# Patient Record
Sex: FEMALE | Race: WHITE | Employment: UNEMPLOYED | URBAN - METROPOLITAN AREA
[De-identification: names, ages, dates, MRNs, and addresses within clinical notes are randomized per-mention and may not be internally consistent; named-entity substitution may affect disease eponyms.]

---

## 2023-08-20 ENCOUNTER — HOSPITAL ENCOUNTER (EMERGENCY)
Age: 28
Discharge: HOME | End: 2023-08-20
Attending: EMERGENCY MEDICINE

## 2023-08-20 ENCOUNTER — APPOINTMENT (OUTPATIENT)
Dept: CT IMAGING | Age: 28
End: 2023-08-20

## 2023-08-20 VITALS
BODY MASS INDEX: 34.15 KG/M2 | RESPIRATION RATE: 18 BRPM | DIASTOLIC BLOOD PRESSURE: 99 MMHG | TEMPERATURE: 97.9 F | SYSTOLIC BLOOD PRESSURE: 142 MMHG | HEART RATE: 90 BPM | OXYGEN SATURATION: 99 % | WEIGHT: 200 LBS | HEIGHT: 64 IN

## 2023-08-20 DIAGNOSIS — H57.9 BILATERAL EYE COMPLAINT: Primary | ICD-10-CM

## 2023-08-20 PROCEDURE — 70450 CT HEAD/BRAIN W/O DYE: CPT

## 2023-08-20 PROCEDURE — 99284 EMERGENCY DEPT VISIT MOD MDM: CPT

## 2023-08-20 RX ORDER — ACETAZOLAMIDE 250 MG/1
250 TABLET ORAL 2 TIMES DAILY
Qty: 60 TABLET | Refills: 0 | Status: SHIPPED | OUTPATIENT
Start: 2023-08-20 | End: 2023-09-19

## 2023-08-20 RX ORDER — TOPIRAMATE 50 MG/1
50 TABLET, FILM COATED ORAL SEE ADMIN INSTRUCTIONS
Qty: 45 TABLET | Refills: 0 | Status: SHIPPED | OUTPATIENT
Start: 2023-08-20 | End: 2023-09-19

## 2023-08-20 RX ORDER — POTASSIUM CHLORIDE 750 MG/1
10 TABLET, EXTENDED RELEASE ORAL EVERY OTHER DAY
Qty: 15 TABLET | Refills: 0 | Status: SHIPPED | OUTPATIENT
Start: 2023-08-20 | End: 2023-09-19

## 2023-08-20 RX ORDER — FUROSEMIDE 20 MG/1
20 TABLET ORAL EVERY OTHER DAY
Qty: 15 TABLET | Refills: 0 | Status: SHIPPED | OUTPATIENT
Start: 2023-08-20 | End: 2023-09-19

## 2023-08-20 ASSESSMENT — LIFESTYLE VARIABLES
HOW OFTEN DO YOU HAVE A DRINK CONTAINING ALCOHOL: NEVER
HOW MANY STANDARD DRINKS CONTAINING ALCOHOL DO YOU HAVE ON A TYPICAL DAY: PATIENT DOES NOT DRINK

## 2023-08-20 ASSESSMENT — VISUAL ACUITY
OS: 20/20
OD: 20/20
OU: 20/20

## 2023-08-20 ASSESSMENT — ENCOUNTER SYMPTOMS
SHORTNESS OF BREATH: 0
EYE PAIN: 0
ABDOMINAL PAIN: 0
NAUSEA: 0
PHOTOPHOBIA: 0
VOMITING: 0

## 2023-08-20 ASSESSMENT — PAIN - FUNCTIONAL ASSESSMENT: PAIN_FUNCTIONAL_ASSESSMENT: NONE - DENIES PAIN

## 2023-08-21 NOTE — ED PROVIDER NOTES
[address] [facility]  Emergency Department    DISPOSITION Decision To Discharge 08/20/2023 11:17:07 PM       ICD-10-CM    1. Bilateral eye complaint  H57.9         ED Course     ED Course as of 08/20/23 2324   Sun Aug 20, 2023   2221 Patient is a 60-year-old female with history of intracranial hypertension that has been off medications for almost a year now and has not seen her doctor who presents with concerns of \"lazy eye\" and \"losing her vision\" and feeling \"spacey in the head\". She states she is unsure if she was having a stroke or having some complication or what was going on because it did happen the past 3 days 1, be seen. Physical exam here today looks okay including equal round and reactive pupils bilaterally, normal visual acuity, no evidence of papilledema on funduscopic. Will obtain CT scan of the head at this time. [TT]   2245 CT HEAD WO CONTRAST  IMPRESSION:  1. No acute intracranial process. MRI is more sensitive for the detection of   acute nonhemorrhagic infarct. Sanjay Luna M.D.   8/20/2023 10:42:00 PM [TT]   2316 CT unremarkable here today. Patient is not experiencing significant hypertension, severe headache, with no evidence of papilledema present that would suggest need for any sort of intervention here along with an unremarkable physical exam.  We will restart the patient on her Diamox, Topamax, another medication she used to take and encouraged her to follow-up with her specialist.  This plan was discussed with the patient who reports understanding and agreement. [TT]      ED Course User Index  [TT] Krystyna Hernandez PA-C     Complexity of Problems Addressed:  1 acute, stable illness    Data Reviewed and Analyzed:  Category 1:   I ordered each unique test.  I interpreted the results of each unique test.      Category 2:   I interpreted the CT Scan no evidence of mass or acute hemorrhage. Category 3: Discussion of management or test interpretation.   See ED Course

## 2023-08-21 NOTE — DISCHARGE INSTRUCTIONS
Physical exam here today is reassuring. Head CT is negative for any acute abnormality. We have restarted your medications used to previously take. Encourage you to schedule follow-up with your specialist.  Return to the ED as needed for new or worsening symptoms.

## 2023-08-28 ENCOUNTER — CLINICAL DOCUMENTATION (OUTPATIENT)
Dept: NEUROLOGY | Age: 28
End: 2023-08-28

## 2023-08-28 DIAGNOSIS — G93.2 PSEUDOTUMOR CEREBRI: Primary | ICD-10-CM

## 2023-09-01 ENCOUNTER — OFFICE VISIT (OUTPATIENT)
Dept: NEUROLOGY | Age: 28
End: 2023-09-01

## 2023-09-01 ENCOUNTER — HOSPITAL ENCOUNTER (OUTPATIENT)
Dept: INTERVENTIONAL RADIOLOGY/VASCULAR | Age: 28
End: 2023-09-01
Attending: PSYCHIATRY & NEUROLOGY

## 2023-09-01 VITALS — DIASTOLIC BLOOD PRESSURE: 72 MMHG | SYSTOLIC BLOOD PRESSURE: 111 MMHG | HEART RATE: 77 BPM | OXYGEN SATURATION: 97 %

## 2023-09-01 DIAGNOSIS — G93.2 PSEUDOTUMOR CEREBRI: Primary | ICD-10-CM

## 2023-09-01 DIAGNOSIS — M54.50 ACUTE BILATERAL LOW BACK PAIN WITHOUT SCIATICA: Primary | ICD-10-CM

## 2023-09-01 DIAGNOSIS — G93.2 PSEUDOTUMOR CEREBRI: ICD-10-CM

## 2023-09-01 LAB
APPEARANCE FLD: CLEAR
COLOR FLD: COLORLESS
NUC CELL # FLD: 4 /CU MM
PROT CSF-MCNC: 25 MG/DL (ref 15–45)
RBC # FLD: 107 /CU MM
SPECIMEN SOURCE FLD: NORMAL
TUBE # CSF: 1

## 2023-09-01 PROCEDURE — 6370000000 HC RX 637 (ALT 250 FOR IP): Performed by: PHYSICIAN ASSISTANT

## 2023-09-01 PROCEDURE — 2500000003 HC RX 250 WO HCPCS: Performed by: PHYSICIAN ASSISTANT

## 2023-09-01 PROCEDURE — 89050 BODY FLUID CELL COUNT: CPT

## 2023-09-01 PROCEDURE — 82040 ASSAY OF SERUM ALBUMIN: CPT

## 2023-09-01 PROCEDURE — 82042 OTHER SOURCE ALBUMIN QUAN EA: CPT

## 2023-09-01 PROCEDURE — 62328 DX LMBR SPI PNXR W/FLUOR/CT: CPT

## 2023-09-01 PROCEDURE — 82784 ASSAY IGA/IGD/IGG/IGM EACH: CPT

## 2023-09-01 PROCEDURE — 84157 ASSAY OF PROTEIN OTHER: CPT

## 2023-09-01 PROCEDURE — 83916 OLIGOCLONAL BANDS: CPT

## 2023-09-01 RX ORDER — HYDROCODONE BITARTRATE AND ACETAMINOPHEN 5; 325 MG/1; MG/1
1 TABLET ORAL EVERY 6 HOURS PRN
Qty: 3 TABLET | Refills: 0 | Status: SHIPPED | OUTPATIENT
Start: 2023-09-01 | End: 2023-09-02

## 2023-09-01 RX ORDER — HYDROCODONE BITARTRATE AND ACETAMINOPHEN 5; 325 MG/1; MG/1
TABLET ORAL PRN
Status: COMPLETED | OUTPATIENT
Start: 2023-09-01 | End: 2023-09-01

## 2023-09-01 RX ORDER — LIDOCAINE HYDROCHLORIDE 20 MG/ML
INJECTION, SOLUTION INFILTRATION; PERINEURAL PRN
Status: COMPLETED | OUTPATIENT
Start: 2023-09-01 | End: 2023-09-01

## 2023-09-01 RX ORDER — METHAZOLAMIDE 25 MG/1
25 TABLET ORAL 2 TIMES DAILY
Qty: 60 TABLET | Refills: 5 | Status: SHIPPED | OUTPATIENT
Start: 2023-09-01

## 2023-09-01 RX ADMIN — LIDOCAINE HYDROCHLORIDE 8 ML: 20 INJECTION, SOLUTION INFILTRATION; PERINEURAL at 12:21

## 2023-09-01 RX ADMIN — HYDROCODONE BITARTRATE AND ACETAMINOPHEN 1 TABLET: 5; 325 TABLET ORAL at 13:19

## 2023-09-01 NOTE — PROGRESS NOTES
[facility], [address], [address]  Phone: [phone] Fax [phone]  Dr. Emily Baig      9/1/2023  Domitila Watson     Patient is referred by the following provider for consultation regarding as below:       I reviewed the available records and notes and have examined patient with the following findings:     Chief Complaint:  No chief complaint on file. HPI: This is a right handed 32 y.o.  female who is very pleasant very appropriate patient who I last saw on 10- at which time she had been diagnosed with pseudotumor cerebri for years previously. She had visual changes headaches and papilledema. Her spinal tap originally was appropriate showed a pressures of 33 and she was unable to tolerate Diamox. She was placed on Topamax which really did not help much. So she was actually did well for several years after the spinal tap without any treatment. And then it progressively got worse with visual changes headaches and papilledema we respinal tap done  with an opening pressure of 45 dropped to 10 we did an MRV which was normal I put her on mepazine 25 mg twice a day which she does not recall. She does not recall her initial eye evaluation but she did not care for the eye doctor anyways. We then ordered another lumbar puncture on 11-7-2022 but it did not occur. We have not seen or heard from her since. She states in the last year she has been doing very well with maybe 1 or 2 minutes of headache and visual changes a month or even every several months. So nothing significant until about 2 weeks to 1 week ago when it started progressively getting worse with headaches and visual changes to the point where she could barely operate a motor vehicle she cannot see at all. She consists see but is just significantly decreased. To an unstable level. We have ordered an LP but it will not be done for another week.   But I am going to

## 2023-09-01 NOTE — OR NURSING
Patient complaint of headache. Provider notified and order received for Norco 5. Med given. Continues to lay flat and to drink fluids.

## 2023-09-01 NOTE — OR NURSING
Recovery period without difficulty. Pt alert and oriented and denies pain. Dressing is clean, dry, and intact. Reviewed discharge instructions with patient and , both verbalized understanding. Pt escorted to lobby discharge area via wheelchair. Vital signs and Dixie score completed.

## 2023-09-05 ENCOUNTER — CLINICAL DOCUMENTATION (OUTPATIENT)
Dept: NEUROLOGY | Age: 28
End: 2023-09-05

## 2023-09-05 NOTE — PROGRESS NOTES
I spoke to her over the weekend and her eye pain is improving but not resolved yet. She had some neck pain that took her back to the emergency room. Her vision continues at the moment to be difficult but she just had the spinal tap done. And we do have an ASAP ophthalmology evaluation ordered.

## 2023-09-06 LAB — OLIGOCLONAL BANDS.IT SER+CSF QL: NORMAL

## 2023-09-13 ENCOUNTER — CLINICAL DOCUMENTATION (OUTPATIENT)
Dept: NEUROLOGY | Age: 28
End: 2023-09-13

## 2023-09-13 NOTE — PROGRESS NOTES
I received the evaluation from Idania Suarez date of study was 3311. The eye doctor clearly identified papilledema and increased intercranial pressure. We had just spinal tap the patient.   This is the evaluation the patient did not feel comfortable with and is going to see her friends ophthalmologist.  Nonetheless clearly seen

## 2023-10-13 ENCOUNTER — OFFICE VISIT (OUTPATIENT)
Dept: NEUROLOGY | Age: 28
End: 2023-10-13

## 2023-10-13 VITALS
HEART RATE: 91 BPM | BODY MASS INDEX: 38.62 KG/M2 | WEIGHT: 226.2 LBS | HEIGHT: 64 IN | SYSTOLIC BLOOD PRESSURE: 107 MMHG | DIASTOLIC BLOOD PRESSURE: 76 MMHG | OXYGEN SATURATION: 98 %

## 2023-10-13 DIAGNOSIS — G93.2 PSEUDOTUMOR CEREBRI: Primary | ICD-10-CM

## 2023-10-13 PROCEDURE — 99212 OFFICE O/P EST SF 10 MIN: CPT | Performed by: PSYCHIATRY & NEUROLOGY

## 2023-10-13 ASSESSMENT — ENCOUNTER SYMPTOMS
GASTROINTESTINAL NEGATIVE: 1
ALLERGIC/IMMUNOLOGIC NEGATIVE: 1

## 2023-10-13 ASSESSMENT — PATIENT HEALTH QUESTIONNAIRE - PHQ9
SUM OF ALL RESPONSES TO PHQ QUESTIONS 1-9: 1
SUM OF ALL RESPONSES TO PHQ9 QUESTIONS 1 & 2: 1
SUM OF ALL RESPONSES TO PHQ QUESTIONS 1-9: 1
1. LITTLE INTEREST OR PLEASURE IN DOING THINGS: 0
2. FEELING DOWN, DEPRESSED OR HOPELESS: 1
SUM OF ALL RESPONSES TO PHQ QUESTIONS 1-9: 1
SUM OF ALL RESPONSES TO PHQ QUESTIONS 1-9: 1

## 2023-10-13 NOTE — PROGRESS NOTES
[facility], [address] [facility] [address], [address]  Phone: [phone] Fax [phone]  Dr. Rio Bartholomew      10/13/2023  Vitaliy Hernandes     Patient is referred by the following provider for consultation regarding as below:       I reviewed the available records and notes and have examined patient with the following findings:     Chief Complaint:  Chief Complaint   Patient presents with    Follow-up    Migraine     Follow up re: migraines & Vision          HPI: This is a right handed 29 y.o.  female very pleasant very appropriate patient last visit by me was on 9-1-2023 at which time she was having symptoms of her pseudotumor cerebri she had visual changes and severe headaches. Her original diagnosis was made many years ago. The original opening pressure was 33 but that was years ago she could not tolerate Diamox. So she would a couple years I think without any treatment still did well up until recently when the symptoms started return. We even did an MRV and this October 2022. Spinal tap in October 2022 was 45 opening pressure. Again her most recent opening pressure done on 9-1-2023 was 33 that we dropped to 15 she could not tolerate Diamox so we put her on mepazine 25 mg twice a day. She is sent to eye doctors both of which the second 1 felt that the papilledema had improved the first 1 felt there if there was increased intracranial pressures with papilledema that was prior to the spinal tap. Again she has failed Topamax and Diamox in the past she had quite a few questions about pregnancy certainly if she becomes pregnant organ have to get her off the medications. And we will try to get her through this without having any shunting. IMAGING REVIEW:  I REVIEWED PERTINENT  IMAGES AND REPORTS WITH THE PATIENT PERSONALLY, DIRECTLY AND FULLY.      Past Medical History:  Past Medical History:   Diagnosis Date    Pseudotumor        Past Surgical History:  No past

## 2023-10-25 ENCOUNTER — CLINICAL DOCUMENTATION (OUTPATIENT)
Dept: NEUROLOGY | Age: 28
End: 2023-10-25

## 2023-10-25 DIAGNOSIS — G93.2 PSEUDOTUMOR: Primary | ICD-10-CM

## 2023-10-25 RX ORDER — ACETAZOLAMIDE 250 MG/1
250 TABLET ORAL 2 TIMES DAILY
Qty: 60 TABLET | Refills: 11 | Status: SHIPPED | OUTPATIENT
Start: 2023-10-25

## 2023-10-25 RX ORDER — ACETAZOLAMIDE 500 MG/1
500 CAPSULE, EXTENDED RELEASE ORAL 2 TIMES DAILY
Qty: 60 CAPSULE | Refills: 11 | Status: SHIPPED | OUTPATIENT
Start: 2023-10-25 | End: 2023-10-25

## 2023-10-25 NOTE — PROGRESS NOTES
I did speak with the patient and all of her symptoms are returning her last spinal tap was done in September and the pressures were elevated at that time were going to respinal tap or again work on obligated to it. Were going to switch her back to Diamox 250 bid and see if she can tolerate it and I will set her up with her neurosurgical consult.   Spinal tap last spinal tap was done in September

## 2023-10-27 ENCOUNTER — TELEPHONE (OUTPATIENT)
Dept: INTERVENTIONAL RADIOLOGY/VASCULAR | Age: 28
End: 2023-10-27

## 2023-10-27 NOTE — TELEPHONE ENCOUNTER
[] Phone call to: Patient    [] Number used to reach this person: [phone]    [] Voicemail reached: N/A     [] Appointment date:  11/6/23    [] Arrival time:  0815    [] Location given: yes    [] AM Medicine with a small sip of water: Yes    [] Patient is NPO: No    [] Need for : Yes    [] Anesthetic Local    [] Blood thinners held: No    [] Education on Hold requirements prior to procedure: na     [] Allergies: OTHER:      [x] Reviewed    [] Latex Allergy: Yes, Pt has a LATEX ALLERGY. MADE NOTE OF THIS ON HER APPOINTMENT  CARD. [] Lidocaine Allergy: No    [] CPAP at night: No    [] Any recent infections: Yes    [] Diabetes: No    [] Additional information:  labs under media, opening pressures.  Remove 30 cc's of csf.      [] Time taken to answer all questions    [] Call back phone number of [phone] given

## 2023-11-06 ENCOUNTER — APPOINTMENT (OUTPATIENT)
Dept: CT IMAGING | Age: 28
DRG: 092 | End: 2023-11-06
Payer: COMMERCIAL

## 2023-11-06 ENCOUNTER — TELEPHONE (OUTPATIENT)
Dept: NEUROLOGY | Age: 28
End: 2023-11-06

## 2023-11-06 ENCOUNTER — HOSPITAL ENCOUNTER (OUTPATIENT)
Dept: INTERVENTIONAL RADIOLOGY/VASCULAR | Age: 28
Discharge: HOME | End: 2023-11-09
Attending: PSYCHIATRY & NEUROLOGY

## 2023-11-06 ENCOUNTER — HOSPITAL ENCOUNTER (INPATIENT)
Age: 28
LOS: 8 days | Discharge: HOME | DRG: 092 | End: 2023-11-14
Attending: STUDENT IN AN ORGANIZED HEALTH CARE EDUCATION/TRAINING PROGRAM | Admitting: FAMILY MEDICINE
Payer: COMMERCIAL

## 2023-11-06 ENCOUNTER — CLINICAL DOCUMENTATION (OUTPATIENT)
Dept: NEUROLOGY | Age: 28
End: 2023-11-06

## 2023-11-06 ENCOUNTER — APPOINTMENT (OUTPATIENT)
Dept: GENERAL RADIOLOGY | Age: 28
DRG: 092 | End: 2023-11-06
Payer: COMMERCIAL

## 2023-11-06 VITALS
OXYGEN SATURATION: 98 % | RESPIRATION RATE: 16 BRPM | HEIGHT: 64 IN | SYSTOLIC BLOOD PRESSURE: 105 MMHG | HEART RATE: 74 BPM | WEIGHT: 223 LBS | DIASTOLIC BLOOD PRESSURE: 69 MMHG | TEMPERATURE: 98 F | BODY MASS INDEX: 38.07 KG/M2

## 2023-11-06 DIAGNOSIS — G93.2 PSEUDOTUMOR: ICD-10-CM

## 2023-11-06 DIAGNOSIS — G93.89 PNEUMOCEPHALUS: Primary | ICD-10-CM

## 2023-11-06 DIAGNOSIS — G93.2 PSEUDOTUMOR CEREBRI: ICD-10-CM

## 2023-11-06 DIAGNOSIS — G97.0 CEREBROSPINAL FLUID LEAK AT LUMBAR PUNCTURE SITE: ICD-10-CM

## 2023-11-06 DIAGNOSIS — M54.9 INTRACTABLE BACK PAIN: ICD-10-CM

## 2023-11-06 DIAGNOSIS — M54.9 ACUTE MIDLINE BACK PAIN, UNSPECIFIED BACK LOCATION: ICD-10-CM

## 2023-11-06 LAB
ALBUMIN SERPL-MCNC: 4.1 G/DL (ref 3.5–5)
ALBUMIN/GLOB SERPL: 1.1 (ref 0.4–1.6)
ALP SERPL-CCNC: 89 U/L (ref 50–136)
ALT SERPL-CCNC: 28 U/L (ref 12–65)
ANION GAP SERPL CALC-SCNC: 8 MMOL/L (ref 2–11)
APPEARANCE FLD: CLEAR
AST SERPL-CCNC: 16 U/L (ref 15–37)
BASOPHILS # BLD: 0.1 K/UL (ref 0–0.2)
BASOPHILS NFR BLD: 0 % (ref 0–2)
BILIRUB SERPL-MCNC: 0.3 MG/DL (ref 0.2–1.1)
BUN SERPL-MCNC: 9 MG/DL (ref 6–23)
CALCIUM SERPL-MCNC: 9.4 MG/DL (ref 8.3–10.4)
CHLORIDE SERPL-SCNC: 112 MMOL/L (ref 101–110)
CO2 SERPL-SCNC: 18 MMOL/L (ref 21–32)
COLOR FLD: COLORLESS
CREAT SERPL-MCNC: 0.9 MG/DL (ref 0.6–1)
DIFFERENTIAL METHOD BLD: ABNORMAL
EOSINOPHIL # BLD: 0.3 K/UL (ref 0–0.8)
EOSINOPHIL NFR BLD: 2 % (ref 0.5–7.8)
ERYTHROCYTE [DISTWIDTH] IN BLOOD BY AUTOMATED COUNT: 13.6 % (ref 11.9–14.6)
GLOBULIN SER CALC-MCNC: 3.8 G/DL (ref 2.8–4.5)
GLUCOSE CSF-MCNC: 70 MG/DL (ref 50–70)
GLUCOSE SERPL-MCNC: 107 MG/DL (ref 65–100)
HCT VFR BLD AUTO: 45.7 % (ref 35.8–46.3)
HGB BLD-MCNC: 15.2 G/DL (ref 11.7–15.4)
IMM GRANULOCYTES # BLD AUTO: 0.1 K/UL (ref 0–0.5)
IMM GRANULOCYTES NFR BLD AUTO: 0 % (ref 0–5)
LYMPHOCYTES # BLD: 2.6 K/UL (ref 0.5–4.6)
LYMPHOCYTES NFR BLD: 22 % (ref 13–44)
MCH RBC QN AUTO: 28.7 PG (ref 26.1–32.9)
MCHC RBC AUTO-ENTMCNC: 33.3 G/DL (ref 31.4–35)
MCV RBC AUTO: 86.4 FL (ref 82–102)
MONOCYTES # BLD: 0.5 K/UL (ref 0.1–1.3)
MONOCYTES NFR BLD: 4 % (ref 4–12)
NEUTS SEG # BLD: 8.6 K/UL (ref 1.7–8.2)
NEUTS SEG NFR BLD: 72 % (ref 43–78)
NRBC # BLD: 0 K/UL (ref 0–0.2)
NUC CELL # FLD: 0 /CU MM
PLATELET # BLD AUTO: 320 K/UL (ref 150–450)
PMV BLD AUTO: 11.3 FL (ref 9.4–12.3)
POTASSIUM SERPL-SCNC: 3.8 MMOL/L (ref 3.5–5.1)
PROT CSF-MCNC: 33 MG/DL (ref 15–45)
PROT SERPL-MCNC: 7.9 G/DL (ref 6.3–8.2)
RBC # BLD AUTO: 5.29 M/UL (ref 4.05–5.2)
RBC # FLD: 156 /CU MM
SODIUM SERPL-SCNC: 138 MMOL/L (ref 133–143)
SPECIMEN SOURCE FLD: NORMAL
TROPONIN I SERPL HS-MCNC: 3.4 PG/ML (ref 0–14)
TSH, 3RD GENERATION: 3.28 UIU/ML (ref 0.36–3.74)
TUBE # CSF: 2
TUBE # CSF: 2
WBC # BLD AUTO: 12 K/UL (ref 4.3–11.1)

## 2023-11-06 PROCEDURE — 89050 BODY FLUID CELL COUNT: CPT

## 2023-11-06 PROCEDURE — 1100000000 HC RM PRIVATE

## 2023-11-06 PROCEDURE — 87070 CULTURE OTHR SPECIMN AEROBIC: CPT

## 2023-11-06 PROCEDURE — 99285 EMERGENCY DEPT VISIT HI MDM: CPT

## 2023-11-06 PROCEDURE — 71046 X-RAY EXAM CHEST 2 VIEWS: CPT

## 2023-11-06 PROCEDURE — 2580000003 HC RX 258: Performed by: STUDENT IN AN ORGANIZED HEALTH CARE EDUCATION/TRAINING PROGRAM

## 2023-11-06 PROCEDURE — 82784 ASSAY IGA/IGD/IGG/IGM EACH: CPT

## 2023-11-06 PROCEDURE — 96376 TX/PRO/DX INJ SAME DRUG ADON: CPT

## 2023-11-06 PROCEDURE — 6360000002 HC RX W HCPCS: Performed by: STUDENT IN AN ORGANIZED HEALTH CARE EDUCATION/TRAINING PROGRAM

## 2023-11-06 PROCEDURE — 84484 ASSAY OF TROPONIN QUANT: CPT

## 2023-11-06 PROCEDURE — 80053 COMPREHEN METABOLIC PANEL: CPT

## 2023-11-06 PROCEDURE — 82040 ASSAY OF SERUM ALBUMIN: CPT

## 2023-11-06 PROCEDURE — 6360000004 HC RX CONTRAST MEDICATION: Performed by: STUDENT IN AN ORGANIZED HEALTH CARE EDUCATION/TRAINING PROGRAM

## 2023-11-06 PROCEDURE — 83916 OLIGOCLONAL BANDS: CPT

## 2023-11-06 PROCEDURE — 85025 COMPLETE CBC W/AUTO DIFF WBC: CPT

## 2023-11-06 PROCEDURE — 84157 ASSAY OF PROTEIN OTHER: CPT

## 2023-11-06 PROCEDURE — 87205 SMEAR GRAM STAIN: CPT

## 2023-11-06 PROCEDURE — 62328 DX LMBR SPI PNXR W/FLUOR/CT: CPT

## 2023-11-06 PROCEDURE — 009U3ZX DRAINAGE OF SPINAL CANAL, PERCUTANEOUS APPROACH, DIAGNOSTIC: ICD-10-PCS | Performed by: RADIOLOGY

## 2023-11-06 PROCEDURE — 2500000003 HC RX 250 WO HCPCS: Performed by: PHYSICIAN ASSISTANT

## 2023-11-06 PROCEDURE — 96361 HYDRATE IV INFUSION ADD-ON: CPT

## 2023-11-06 PROCEDURE — B01B1ZZ FLUOROSCOPY OF SPINAL CORD USING LOW OSMOLAR CONTRAST: ICD-10-PCS | Performed by: RADIOLOGY

## 2023-11-06 PROCEDURE — 96375 TX/PRO/DX INJ NEW DRUG ADDON: CPT

## 2023-11-06 PROCEDURE — 72132 CT LUMBAR SPINE W/DYE: CPT

## 2023-11-06 PROCEDURE — 96374 THER/PROPH/DIAG INJ IV PUSH: CPT

## 2023-11-06 PROCEDURE — 82945 GLUCOSE OTHER FLUID: CPT

## 2023-11-06 PROCEDURE — 82042 OTHER SOURCE ALBUMIN QUAN EA: CPT

## 2023-11-06 RX ORDER — MORPHINE SULFATE 2 MG/ML
2 INJECTION, SOLUTION INTRAMUSCULAR; INTRAVENOUS EVERY 4 HOURS PRN
Status: DISCONTINUED | OUTPATIENT
Start: 2023-11-06 | End: 2023-11-08

## 2023-11-06 RX ORDER — MAGNESIUM SULFATE IN WATER 40 MG/ML
2000 INJECTION, SOLUTION INTRAVENOUS PRN
Status: DISCONTINUED | OUTPATIENT
Start: 2023-11-06 | End: 2023-11-14 | Stop reason: HOSPADM

## 2023-11-06 RX ORDER — SODIUM CHLORIDE 0.9 % (FLUSH) 0.9 %
5-40 SYRINGE (ML) INJECTION PRN
Status: DISCONTINUED | OUTPATIENT
Start: 2023-11-06 | End: 2023-11-14 | Stop reason: HOSPADM

## 2023-11-06 RX ORDER — ONDANSETRON 2 MG/ML
8 INJECTION INTRAMUSCULAR; INTRAVENOUS
Status: COMPLETED | OUTPATIENT
Start: 2023-11-06 | End: 2023-11-06

## 2023-11-06 RX ORDER — ACETAZOLAMIDE 250 MG/1
250 TABLET ORAL DAILY
Status: DISCONTINUED | OUTPATIENT
Start: 2023-11-06 | End: 2023-11-14 | Stop reason: HOSPADM

## 2023-11-06 RX ORDER — ACETAMINOPHEN 650 MG/1
650 SUPPOSITORY RECTAL EVERY 6 HOURS PRN
Status: DISCONTINUED | OUTPATIENT
Start: 2023-11-06 | End: 2023-11-14 | Stop reason: HOSPADM

## 2023-11-06 RX ORDER — DEXAMETHASONE SODIUM PHOSPHATE 10 MG/ML
10 INJECTION INTRAMUSCULAR; INTRAVENOUS ONCE
Status: COMPLETED | OUTPATIENT
Start: 2023-11-06 | End: 2023-11-06

## 2023-11-06 RX ORDER — POTASSIUM CHLORIDE 20 MEQ/1
40 TABLET, EXTENDED RELEASE ORAL PRN
Status: DISCONTINUED | OUTPATIENT
Start: 2023-11-06 | End: 2023-11-14 | Stop reason: HOSPADM

## 2023-11-06 RX ORDER — BUTALBITAL, ACETAMINOPHEN AND CAFFEINE 50; 325; 40 MG/1; MG/1; MG/1
1 TABLET ORAL EVERY 6 HOURS PRN
Status: ON HOLD | COMMUNITY
Start: 2023-09-02

## 2023-11-06 RX ORDER — ONDANSETRON 4 MG/1
4 TABLET, ORALLY DISINTEGRATING ORAL EVERY 8 HOURS PRN
Status: DISCONTINUED | OUTPATIENT
Start: 2023-11-06 | End: 2023-11-08

## 2023-11-06 RX ORDER — ACETAMINOPHEN 325 MG/1
650 TABLET ORAL EVERY 6 HOURS PRN
Status: DISCONTINUED | OUTPATIENT
Start: 2023-11-06 | End: 2023-11-14 | Stop reason: HOSPADM

## 2023-11-06 RX ORDER — 0.9 % SODIUM CHLORIDE 0.9 %
1000 INTRAVENOUS SOLUTION INTRAVENOUS
Status: COMPLETED | OUTPATIENT
Start: 2023-11-06 | End: 2023-11-06

## 2023-11-06 RX ORDER — MORPHINE SULFATE 4 MG/ML
4 INJECTION INTRAVENOUS ONCE
Status: COMPLETED | OUTPATIENT
Start: 2023-11-06 | End: 2023-11-06

## 2023-11-06 RX ORDER — FAMOTIDINE 20 MG/1
20 TABLET, FILM COATED ORAL 2 TIMES DAILY
Qty: 60 TABLET | Refills: 11 | Status: ON HOLD | COMMUNITY
Start: 2023-05-09 | End: 2024-05-08

## 2023-11-06 RX ORDER — SODIUM CHLORIDE 0.9 % (FLUSH) 0.9 %
10 SYRINGE (ML) INJECTION
Status: DISCONTINUED | OUTPATIENT
Start: 2023-11-06 | End: 2023-11-14 | Stop reason: HOSPADM

## 2023-11-06 RX ORDER — ONDANSETRON 2 MG/ML
4 INJECTION INTRAMUSCULAR; INTRAVENOUS
Status: DISCONTINUED | OUTPATIENT
Start: 2023-11-06 | End: 2023-11-06

## 2023-11-06 RX ORDER — FAMOTIDINE 20 MG/1
10 TABLET, FILM COATED ORAL DAILY PRN
Status: DISCONTINUED | OUTPATIENT
Start: 2023-11-06 | End: 2023-11-14 | Stop reason: HOSPADM

## 2023-11-06 RX ORDER — HYDROMORPHONE HYDROCHLORIDE 1 MG/ML
1 INJECTION, SOLUTION INTRAMUSCULAR; INTRAVENOUS; SUBCUTANEOUS
Status: COMPLETED | OUTPATIENT
Start: 2023-11-06 | End: 2023-11-06

## 2023-11-06 RX ORDER — CEPHALEXIN 500 MG/1
CAPSULE ORAL
Status: ON HOLD | COMMUNITY
Start: 2023-10-15

## 2023-11-06 RX ORDER — PREDNISONE 20 MG/1
TABLET ORAL
Status: ON HOLD | COMMUNITY
Start: 2023-10-15

## 2023-11-06 RX ORDER — BISACODYL 10 MG
10 SUPPOSITORY, RECTAL RECTAL DAILY PRN
Status: DISCONTINUED | OUTPATIENT
Start: 2023-11-06 | End: 2023-11-14 | Stop reason: HOSPADM

## 2023-11-06 RX ORDER — ONDANSETRON 2 MG/ML
4 INJECTION INTRAMUSCULAR; INTRAVENOUS EVERY 6 HOURS PRN
Status: DISCONTINUED | OUTPATIENT
Start: 2023-11-06 | End: 2023-11-08

## 2023-11-06 RX ORDER — POTASSIUM CHLORIDE 7.45 MG/ML
10 INJECTION INTRAVENOUS PRN
Status: DISCONTINUED | OUTPATIENT
Start: 2023-11-06 | End: 2023-11-14 | Stop reason: HOSPADM

## 2023-11-06 RX ORDER — PROMETHAZINE HYDROCHLORIDE 25 MG/1
25 TABLET ORAL EVERY 6 HOURS PRN
Qty: 20 TABLET | Refills: 0 | Status: ON HOLD | OUTPATIENT
Start: 2023-11-06 | End: 2023-11-13

## 2023-11-06 RX ORDER — SODIUM CHLORIDE 0.9 % (FLUSH) 0.9 %
5-40 SYRINGE (ML) INJECTION EVERY 12 HOURS SCHEDULED
Status: DISCONTINUED | OUTPATIENT
Start: 2023-11-06 | End: 2023-11-14 | Stop reason: HOSPADM

## 2023-11-06 RX ORDER — 0.9 % SODIUM CHLORIDE 0.9 %
100 INTRAVENOUS SOLUTION INTRAVENOUS ONCE
Status: DISCONTINUED | OUTPATIENT
Start: 2023-11-06 | End: 2023-11-14 | Stop reason: HOSPADM

## 2023-11-06 RX ORDER — POTASSIUM CHLORIDE 7.45 MG/ML
10 INJECTION INTRAVENOUS PRN
Status: DISCONTINUED | OUTPATIENT
Start: 2023-11-06 | End: 2023-11-06 | Stop reason: SDUPTHER

## 2023-11-06 RX ORDER — ONDANSETRON 2 MG/ML
4 INJECTION INTRAMUSCULAR; INTRAVENOUS
Status: COMPLETED | OUTPATIENT
Start: 2023-11-06 | End: 2023-11-06

## 2023-11-06 RX ORDER — SODIUM CHLORIDE 9 MG/ML
INJECTION, SOLUTION INTRAVENOUS PRN
Status: DISCONTINUED | OUTPATIENT
Start: 2023-11-06 | End: 2023-11-14 | Stop reason: HOSPADM

## 2023-11-06 RX ORDER — MAGNESIUM HYDROXIDE/ALUMINUM HYDROXICE/SIMETHICONE 120; 1200; 1200 MG/30ML; MG/30ML; MG/30ML
30 SUSPENSION ORAL EVERY 6 HOURS PRN
Status: DISCONTINUED | OUTPATIENT
Start: 2023-11-06 | End: 2023-11-14 | Stop reason: HOSPADM

## 2023-11-06 RX ORDER — KETOROLAC TROMETHAMINE 15 MG/ML
15 INJECTION, SOLUTION INTRAMUSCULAR; INTRAVENOUS ONCE
Status: COMPLETED | OUTPATIENT
Start: 2023-11-06 | End: 2023-11-06

## 2023-11-06 RX ORDER — POLYETHYLENE GLYCOL 3350 17 G/17G
17 POWDER, FOR SOLUTION ORAL DAILY PRN
Status: DISCONTINUED | OUTPATIENT
Start: 2023-11-06 | End: 2023-11-14 | Stop reason: HOSPADM

## 2023-11-06 RX ORDER — LIDOCAINE HYDROCHLORIDE 20 MG/ML
INJECTION, SOLUTION INFILTRATION; PERINEURAL PRN
Status: COMPLETED | OUTPATIENT
Start: 2023-11-06 | End: 2023-11-06

## 2023-11-06 RX ORDER — OXYCODONE HYDROCHLORIDE 5 MG/1
5 TABLET ORAL EVERY 6 HOURS PRN
Status: DISCONTINUED | OUTPATIENT
Start: 2023-11-06 | End: 2023-11-09

## 2023-11-06 RX ADMIN — LIDOCAINE HYDROCHLORIDE 10 ML: 20 INJECTION, SOLUTION INFILTRATION; PERINEURAL at 09:39

## 2023-11-06 RX ADMIN — MORPHINE SULFATE 4 MG: 4 INJECTION INTRAVENOUS at 19:24

## 2023-11-06 RX ADMIN — SODIUM CHLORIDE 1000 ML: 9 INJECTION, SOLUTION INTRAVENOUS at 18:33

## 2023-11-06 RX ADMIN — IOPAMIDOL 100 ML: 755 INJECTION, SOLUTION INTRAVENOUS at 19:56

## 2023-11-06 RX ADMIN — ONDANSETRON 4 MG: 2 INJECTION INTRAMUSCULAR; INTRAVENOUS at 21:45

## 2023-11-06 RX ADMIN — DEXAMETHASONE SODIUM PHOSPHATE 10 MG: 10 INJECTION INTRAMUSCULAR; INTRAVENOUS at 18:32

## 2023-11-06 RX ADMIN — KETOROLAC TROMETHAMINE 15 MG: 15 INJECTION, SOLUTION INTRAMUSCULAR; INTRAVENOUS at 18:32

## 2023-11-06 RX ADMIN — ONDANSETRON 8 MG: 2 INJECTION INTRAMUSCULAR; INTRAVENOUS at 19:25

## 2023-11-06 RX ADMIN — HYDROMORPHONE HYDROCHLORIDE 1 MG: 1 INJECTION, SOLUTION INTRAMUSCULAR; INTRAVENOUS; SUBCUTANEOUS at 21:45

## 2023-11-06 ASSESSMENT — PAIN SCALES - GENERAL
PAINLEVEL_OUTOF10: 10
PAINLEVEL_OUTOF10: 10
PAINLEVEL_OUTOF10: 6
PAINLEVEL_OUTOF10: 10

## 2023-11-06 ASSESSMENT — PAIN DESCRIPTION - LOCATION
LOCATION: BACK
LOCATION: BACK

## 2023-11-06 ASSESSMENT — PAIN - FUNCTIONAL ASSESSMENT: PAIN_FUNCTIONAL_ASSESSMENT: 0-10

## 2023-11-06 ASSESSMENT — PAIN DESCRIPTION - DESCRIPTORS: DESCRIPTORS: ACHING;DISCOMFORT;SHARP

## 2023-11-06 ASSESSMENT — PAIN DESCRIPTION - PAIN TYPE: TYPE: ACUTE PAIN

## 2023-11-06 ASSESSMENT — PAIN DESCRIPTION - FREQUENCY: FREQUENCY: CONTINUOUS

## 2023-11-06 ASSESSMENT — ENCOUNTER SYMPTOMS: BACK PAIN: 1

## 2023-11-06 NOTE — OR NURSING
Pt requested pregnancy test prior to LP. Test completed. Results negative for pregnancy and pt notified of results.

## 2023-11-06 NOTE — ED TRIAGE NOTES
Pt reports had \"spinal tap\" this morning around 0900 and fluid drained. Pt reports has pseudotumor and has this procedure occasionally but has never had symptoms of extreme back pain, abdominal pain, nausea and dyspnea after procedure. Pt unable to sit at this time, tearful and pale.    (-)fever, drainage from procedure site  (+)lightheaded   A&Ox4

## 2023-11-06 NOTE — PROGRESS NOTES
Patients IR spinal tap shows a opening pressure of 40. We already have an order in for neurosurgery for evaluation for shunt.

## 2023-11-06 NOTE — OR NURSING
Recovery period without difficulty. Pt alert and oriented and denies pain. Dressing is clean, dry, and intact. Reviewed discharge instructions with patient and spouse, both verbalized understanding. Pt escorted to lobby discharge area via wheelchair. Vital signs and Dixie score completed.

## 2023-11-06 NOTE — PROGRESS NOTES
Pt phoned at personal phone to return for blood draw.   Pt verbalized to come to Radiology registration to IR for Blood draw

## 2023-11-06 NOTE — TELEPHONE ENCOUNTER
Just called pt and informed her that she is now in the hands of the ER, we unfortunately cannot control how quickly they can take her back but from what she told me it sounds like she will be taken back shortly hopefully. Advised her that she is in the right place and that she needs to be evaluated there first before we advise any further for right now.  Hopefully she feels better soon

## 2023-11-06 NOTE — TELEPHONE ENCOUNTER
Pt called and stated that she spoke with Dr. Shanel Lopez earlier about her spinal tap that she had and that she had rib and upper back pain and she was advised to go to the ER. She went to the ER but has been there for a very long time and they have not taken her back still. She had to ask them for a blanket in the waiting area to lay on the ground of the ER floor because she cannot sit due to her intense pain.     Please advise

## 2023-11-06 NOTE — ED PROVIDER NOTES
Emergency Department Provider Note       PCP: Darryl Brito MD   Age: 29 y.o. Sex: female     DISPOSITION Admitted 11/06/2023 10:40:48 PM       ICD-10-CM    1. Acute midline back pain, unspecified back location  M54.9     post Lumbar puncture          Medical Decision Making     Complexity of Problems Addressed:  1 or more acute illnesses that pose a threat to life or bodily function. Data Reviewed and Analyzed:  I independently ordered and reviewed each unique test.  I reviewed external records: ED visit note from an outside group. I reviewed external records: provider visit note from PCP. I reviewed external records: provider visit note from outside specialist.       I interpreted the X-rays no focal infiltrate. I interpreted the CT Scan no evidence of large hematoma. Discussion of management or test interpretation. 79-year-old female patient with history of pseudotumor cerebri presenting to this department with reports of severe back, flank and chest discomfort after outpatient therapeutic LP performed this morning. Patient appears very uncomfortable, unable to sit secondary to increased pain. Orders placed for basic labs, fluids, Decadron and Toradol given patient's reports of headache. CT imaging shows no evidence of hematoma or other abnormality. There is mention of some air secondary to the procedure. Lab work is unremarkable, patient vitally stable in this department. On reassessment she reports continued pain stating that her symptoms are unimproved and worsened with lying flat to obtain the CT imaging itself. For this reason I decided to discuss with on-call interventional radiologist.  Dr. Boni Nails of interventional radiology states he agrees patient's symptoms are inconsistent with post LP headache. States on-call provider tomorrow would be happy to round on the patient if she requires admission.   At this point I have given patient multiple doses of pain medication and she

## 2023-11-07 ENCOUNTER — APPOINTMENT (OUTPATIENT)
Dept: CT IMAGING | Age: 28
DRG: 092 | End: 2023-11-07
Payer: COMMERCIAL

## 2023-11-07 LAB
ANION GAP SERPL CALC-SCNC: 7 MMOL/L (ref 2–11)
BASOPHILS # BLD: 0 K/UL (ref 0–0.2)
BASOPHILS NFR BLD: 0 % (ref 0–2)
BUN SERPL-MCNC: 8 MG/DL (ref 6–23)
CALCIUM SERPL-MCNC: 8.6 MG/DL (ref 8.3–10.4)
CHLORIDE SERPL-SCNC: 113 MMOL/L (ref 101–110)
CO2 SERPL-SCNC: 18 MMOL/L (ref 21–32)
CREAT SERPL-MCNC: 0.9 MG/DL (ref 0.6–1)
DIFFERENTIAL METHOD BLD: ABNORMAL
EOSINOPHIL # BLD: 0 K/UL (ref 0–0.8)
EOSINOPHIL NFR BLD: 0 % (ref 0.5–7.8)
ERYTHROCYTE [DISTWIDTH] IN BLOOD BY AUTOMATED COUNT: 13.4 % (ref 11.9–14.6)
GLUCOSE SERPL-MCNC: 138 MG/DL (ref 65–100)
HCT VFR BLD AUTO: 42.4 % (ref 35.8–46.3)
HGB BLD-MCNC: 14 G/DL (ref 11.7–15.4)
IMM GRANULOCYTES # BLD AUTO: 0 K/UL (ref 0–0.5)
IMM GRANULOCYTES NFR BLD AUTO: 0 % (ref 0–5)
LYMPHOCYTES # BLD: 0.7 K/UL (ref 0.5–4.6)
LYMPHOCYTES NFR BLD: 8 % (ref 13–44)
MCH RBC QN AUTO: 28.9 PG (ref 26.1–32.9)
MCHC RBC AUTO-ENTMCNC: 33 G/DL (ref 31.4–35)
MCV RBC AUTO: 87.4 FL (ref 82–102)
MONOCYTES # BLD: 0.1 K/UL (ref 0.1–1.3)
MONOCYTES NFR BLD: 1 % (ref 4–12)
NEUTS SEG # BLD: 7.8 K/UL (ref 1.7–8.2)
NEUTS SEG NFR BLD: 91 % (ref 43–78)
NRBC # BLD: 0 K/UL (ref 0–0.2)
PLATELET # BLD AUTO: 281 K/UL (ref 150–450)
PMV BLD AUTO: 11.4 FL (ref 9.4–12.3)
POTASSIUM SERPL-SCNC: 4 MMOL/L (ref 3.5–5.1)
RBC # BLD AUTO: 4.85 M/UL (ref 4.05–5.2)
SODIUM SERPL-SCNC: 138 MMOL/L (ref 133–143)
WBC # BLD AUTO: 8.6 K/UL (ref 4.3–11.1)

## 2023-11-07 PROCEDURE — 36415 COLL VENOUS BLD VENIPUNCTURE: CPT

## 2023-11-07 PROCEDURE — 99232 SBSQ HOSP IP/OBS MODERATE 35: CPT | Performed by: PSYCHIATRY & NEUROLOGY

## 2023-11-07 PROCEDURE — 72126 CT NECK SPINE W/DYE: CPT

## 2023-11-07 PROCEDURE — 6370000000 HC RX 637 (ALT 250 FOR IP): Performed by: PHYSICIAN ASSISTANT

## 2023-11-07 PROCEDURE — 1100000000 HC RM PRIVATE

## 2023-11-07 PROCEDURE — 85025 COMPLETE CBC W/AUTO DIFF WBC: CPT

## 2023-11-07 PROCEDURE — 6370000000 HC RX 637 (ALT 250 FOR IP): Performed by: FAMILY MEDICINE

## 2023-11-07 PROCEDURE — 6370000000 HC RX 637 (ALT 250 FOR IP): Performed by: INTERNAL MEDICINE

## 2023-11-07 PROCEDURE — 6360000004 HC RX CONTRAST MEDICATION: Performed by: PSYCHIATRY & NEUROLOGY

## 2023-11-07 PROCEDURE — 72129 CT CHEST SPINE W/DYE: CPT

## 2023-11-07 PROCEDURE — 70450 CT HEAD/BRAIN W/O DYE: CPT

## 2023-11-07 PROCEDURE — 2580000003 HC RX 258: Performed by: INTERNAL MEDICINE

## 2023-11-07 PROCEDURE — 6360000002 HC RX W HCPCS: Performed by: FAMILY MEDICINE

## 2023-11-07 PROCEDURE — 80048 BASIC METABOLIC PNL TOTAL CA: CPT

## 2023-11-07 PROCEDURE — 2580000003 HC RX 258: Performed by: FAMILY MEDICINE

## 2023-11-07 RX ORDER — PROMETHAZINE HYDROCHLORIDE 25 MG/1
25 TABLET ORAL EVERY 6 HOURS PRN
Status: DISCONTINUED | OUTPATIENT
Start: 2023-11-07 | End: 2023-11-08

## 2023-11-07 RX ORDER — IBUPROFEN 400 MG/1
400 TABLET ORAL EVERY 6 HOURS PRN
Status: DISCONTINUED | OUTPATIENT
Start: 2023-11-07 | End: 2023-11-14 | Stop reason: HOSPADM

## 2023-11-07 RX ORDER — 0.9 % SODIUM CHLORIDE 0.9 %
1000 INTRAVENOUS SOLUTION INTRAVENOUS ONCE
Status: COMPLETED | OUTPATIENT
Start: 2023-11-07 | End: 2023-11-07

## 2023-11-07 RX ORDER — METAXALONE 800 MG/1
800 TABLET ORAL 3 TIMES DAILY
Status: DISCONTINUED | OUTPATIENT
Start: 2023-11-07 | End: 2023-11-14 | Stop reason: HOSPADM

## 2023-11-07 RX ORDER — PROMETHAZINE HYDROCHLORIDE 25 MG/1
25 TABLET ORAL ONCE
Status: COMPLETED | OUTPATIENT
Start: 2023-11-07 | End: 2023-11-07

## 2023-11-07 RX ADMIN — ACETAZOLAMIDE 250 MG: 250 TABLET ORAL at 08:05

## 2023-11-07 RX ADMIN — ONDANSETRON 4 MG: 2 INJECTION INTRAMUSCULAR; INTRAVENOUS at 03:00

## 2023-11-07 RX ADMIN — SODIUM CHLORIDE 1000 ML: 9 INJECTION, SOLUTION INTRAVENOUS at 12:41

## 2023-11-07 RX ADMIN — PROMETHAZINE HYDROCHLORIDE 25 MG: 25 TABLET ORAL at 00:31

## 2023-11-07 RX ADMIN — OXYCODONE HYDROCHLORIDE 5 MG: 5 TABLET ORAL at 18:20

## 2023-11-07 RX ADMIN — MORPHINE SULFATE 2 MG: 2 INJECTION, SOLUTION INTRAMUSCULAR; INTRAVENOUS at 13:03

## 2023-11-07 RX ADMIN — MORPHINE SULFATE 2 MG: 2 INJECTION, SOLUTION INTRAMUSCULAR; INTRAVENOUS at 00:31

## 2023-11-07 RX ADMIN — MORPHINE SULFATE 2 MG: 2 INJECTION, SOLUTION INTRAMUSCULAR; INTRAVENOUS at 23:43

## 2023-11-07 RX ADMIN — OXYCODONE HYDROCHLORIDE 5 MG: 5 TABLET ORAL at 11:57

## 2023-11-07 RX ADMIN — METAXALONE 800 MG: 800 TABLET ORAL at 19:31

## 2023-11-07 RX ADMIN — METAXALONE 800 MG: 800 TABLET ORAL at 15:14

## 2023-11-07 RX ADMIN — SODIUM CHLORIDE, PRESERVATIVE FREE 10 ML: 5 INJECTION INTRAVENOUS at 00:32

## 2023-11-07 RX ADMIN — ONDANSETRON 4 MG: 2 INJECTION INTRAMUSCULAR; INTRAVENOUS at 23:43

## 2023-11-07 RX ADMIN — MORPHINE SULFATE 2 MG: 2 INJECTION, SOLUTION INTRAMUSCULAR; INTRAVENOUS at 08:03

## 2023-11-07 RX ADMIN — OXYCODONE HYDROCHLORIDE 5 MG: 5 TABLET ORAL at 02:56

## 2023-11-07 RX ADMIN — PROMETHAZINE HYDROCHLORIDE 25 MG: 25 TABLET ORAL at 19:31

## 2023-11-07 RX ADMIN — IOPAMIDOL 100 ML: 755 INJECTION, SOLUTION INTRAVENOUS at 16:55

## 2023-11-07 RX ADMIN — SODIUM CHLORIDE, PRESERVATIVE FREE 10 ML: 5 INJECTION INTRAVENOUS at 19:31

## 2023-11-07 RX ADMIN — SODIUM CHLORIDE, PRESERVATIVE FREE 10 ML: 5 INJECTION INTRAVENOUS at 08:05

## 2023-11-07 RX ADMIN — IBUPROFEN 400 MG: 400 TABLET, FILM COATED ORAL at 19:31

## 2023-11-07 RX ADMIN — MORPHINE SULFATE 2 MG: 2 INJECTION, SOLUTION INTRAMUSCULAR; INTRAVENOUS at 17:19

## 2023-11-07 ASSESSMENT — PAIN DESCRIPTION - ORIENTATION
ORIENTATION: UPPER
ORIENTATION: MID
ORIENTATION: LEFT
ORIENTATION: UPPER
ORIENTATION: MID

## 2023-11-07 ASSESSMENT — PAIN DESCRIPTION - LOCATION
LOCATION: ABDOMEN;BACK;HEAD
LOCATION: ABDOMEN
LOCATION: HEAD;ABDOMEN
LOCATION: ABDOMEN
LOCATION: NECK
LOCATION: NECK
LOCATION: BACK
LOCATION: ABDOMEN;NECK;BACK
LOCATION: NECK

## 2023-11-07 ASSESSMENT — PAIN SCALES - GENERAL
PAINLEVEL_OUTOF10: 5
PAINLEVEL_OUTOF10: 10
PAINLEVEL_OUTOF10: 9
PAINLEVEL_OUTOF10: 6
PAINLEVEL_OUTOF10: 7
PAINLEVEL_OUTOF10: 0
PAINLEVEL_OUTOF10: 10
PAINLEVEL_OUTOF10: 6
PAINLEVEL_OUTOF10: 8
PAINLEVEL_OUTOF10: 8
PAINLEVEL_OUTOF10: 4
PAINLEVEL_OUTOF10: 10
PAINLEVEL_OUTOF10: 10

## 2023-11-07 ASSESSMENT — PAIN - FUNCTIONAL ASSESSMENT
PAIN_FUNCTIONAL_ASSESSMENT: PREVENTS OR INTERFERES WITH ALL ACTIVE AND SOME PASSIVE ACTIVITIES
PAIN_FUNCTIONAL_ASSESSMENT: PREVENTS OR INTERFERES WITH ALL ACTIVE AND SOME PASSIVE ACTIVITIES
PAIN_FUNCTIONAL_ASSESSMENT: ACTIVITIES ARE NOT PREVENTED
PAIN_FUNCTIONAL_ASSESSMENT: ACTIVITIES ARE NOT PREVENTED

## 2023-11-07 ASSESSMENT — PAIN DESCRIPTION - DESCRIPTORS
DESCRIPTORS: SHARP
DESCRIPTORS: ACHING;SHARP
DESCRIPTORS: ACHING
DESCRIPTORS: ACHING
DESCRIPTORS: SHARP

## 2023-11-07 ASSESSMENT — PAIN DESCRIPTION - FREQUENCY
FREQUENCY: CONTINUOUS

## 2023-11-07 ASSESSMENT — PAIN DESCRIPTION - ONSET
ONSET: ON-GOING
ONSET: AWAKENED FROM SLEEP
ONSET: ON-GOING
ONSET: AWAKENED FROM SLEEP

## 2023-11-07 ASSESSMENT — PAIN DESCRIPTION - PAIN TYPE
TYPE: ACUTE PAIN

## 2023-11-07 NOTE — PROGRESS NOTES
END OF SHIFT SUMMARY:    Significant vitals this shift:  none  Significant labs this shift:  none  Tests performed this shift:  CT head/neck/spine  Orders to be followed up on:  CT  Blood products given this shift:  none  Additional events this shift:   Ptc/o of neck pain throughout shift. Pain medication given. Encouraged to take a stool softener.     I/Os:  +/- this shift:   11/07 0701 - 11/07 1900  In: 1419.7 [P.O.:120]  Out: 1800 [Urine:1800]  Unmeasured Occurrences this Shift:  Urine yes, BM no, Emesis no      Bedside shift report given to Jewels Flores RN

## 2023-11-07 NOTE — H&P
Hospitalist History and Physical   Admit Date:  2023  6:14 PM   Name:  Manisha Spangler   Age:  28 y.o.  Sex:  female  :  1995   MRN:  [id number]   Room:  [place] [address]    Presenting/Chief Complaint: Back Pain and Post-op Problem     Reason(s) for Admission: Intractable back pain [M54.9]     History of Present Illness:   Manisha Spangler is a 28 y.o. female who presented to the ED for cc intractable low/thoracic back pain since her LP this AM on  for pseudotumor cerebri with opening pressure of 40. Follows Dr. Collins for this with an order for neurosurgery to eval for shunt in the future.     Hx of anxiety,  pseudotumor cerebri    Only taking diamox daily  Assessment & Plan:     Active Problems:      Intractable low back pain after lumbar puncture - ER provider has already spoken with Dr. Bledsoe with IR who will see in AM. Unsure if would benefit from blood patch. Will also consult neurology for further recs on medications we can try for patient--she is only on daily diamox. PRN pain control      PT/OT evals and PPD needed/ordered?  No  Diet: ADULT DIET; Regular  VTE prophylaxis: SCD's   Code status: Full Code      Non-peripheral Lines and Tubes (if present):             Hospital Problems:  Principal Problem:    Intractable back pain  Active Problems:    Pseudotumor cerebri  Resolved Problems:    * No resolved hospital problems. *      Past History:     Past Medical History:   Diagnosis Date    Pseudotumor        History reviewed. No pertinent surgical history.     Social History     Tobacco Use    Smoking status: Never    Smokeless tobacco: Never   Substance Use Topics    Alcohol use: Not Currently      Social History     Substance and Sexual Activity   Drug Use Yes    Types: Other-see comments    Comment: edibles       History reviewed. No pertinent family history.       There is no immunization history on file for this patient.  Allergies   Allergen Reactions    Latex Shortness Of  reconstruction. FINDINGS: Tiny bubble of air in the posterior epidural space at the L3-4 level. 5 lumbar types non rib bearing vertebral bodies of normal height and alignment. No acute fracture. At T12-L1, spinal canal and neural foramina are patent. Bulge. At L1-2, spinal canal and neural foramina are patent. Bulge. At L2-3, spinal canal and neural foramina are patent. Bulge. At L3-4, spinal canal and neural foramina are patent. Bulge. At L4-5, spinal canal is patent. Bulge/shallow posterior disc protrusion. Mild neural foraminal narrowing. Facet degeneration. At L5-S1, spinal canal and neural foramina are patent. Bulge/shallow posterior disc protrusion. Mild facet and ligamentous hypertrophy. Degenerative changes sacroiliac joints. .     1. Tiny bubble of air in the posterior epidural space at the L3-4 level, likely from recent lumbar puncture. 2. Mild degenerative changes. 3. MRI is more sensitive to detect spinal infection. Sanjay Luna M.D. 11/6/2023 8:34:00 PM    XR CHEST (2 VW)    Result Date: 11/6/2023  EXAMINATION: 2 view chest DATE: 11/6/2023 6:14 PM COMPARISON: None CLINICAL HISTORY: Chest pain FINDINGS: Costophrenic angles are clear. Heart size is normal. Pulmonary vasculature is not distended. There are no dense regions of consolidation. No acute cardiopulmonary process. Tiff Auguste M.D. 11/6/2023 6:56:00 PM    IR LUMBAR PUNCTURE FOR DIAGNOSIS    Result Date: 11/6/2023  Title: Lumbar puncture. History: 80-year-old female with benign intracranial hypertension :  Ronit Hayden PA-C Supervising Physician: Peter Kelley M.D. Consent: Informed written and oral consent was obtained from the patient after explanation of benefits and risks (including, but not limited to: infection, nerve injury, hemorrhage). The patient's questions were answered to satisfaction. The patient stated understanding and requested that we proceed.  Procedure: Maximal sterile barrier technique was

## 2023-11-07 NOTE — CARE COORDINATION
29 y.o. Female admitted with Intractable back pain and Acute midline back pain, unspecified back location. CM reviewed and discussed in AM IDR. Pt had spinal lumbar puncture prior to admission per MD notes. Neurology following and recommending CT scan of the head, neck, and thoracic spine per notes. CM visited pt in room, spouse at bedside. Pt A & O x 4. Demographics and PCP discussed and verified. Pt discussed medical insurance with [employer] beginning November 1 2023 and do not have cards yet. Pt lives spouse and two minor children. Supportive spouse, mother, and mother-in-law. 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 CM will follow pt plan of care and assist with supportive care referrals pending pt clinical progress. Please consult case management if specific needs arise. ASSESSMENT NOTE    Attending Physician: Kyara Croft MD  Admit Problem: Intractable back pain [M54.9]  Acute midline back pain, unspecified back location [M54.9]  Date/Time of Admission: 11/6/2023  6:14 PM  Problem List:  Patient Active Problem List   Diagnosis    Intractable back pain    Pseudotumor cerebri       Service Assessment  Patient Orientation Alert and Oriented, Person, Place, Situation, Self   Cognition Alert   History Provided By Patient   Primary Caregiver Self   Accompanied By/Relationship [address] Spouse/Significant Other, Parent, Family Members   Patient's Healthcare Decision Maker is: Legal Next of [address] [place] [address]   PCP Verified by CM Yes   Last Visit to PCP Within last two years   Prior Functional Level Independent in ADLs/IADLs   Current Functional Level Assistance with the following:, Toileting   Can patient return to prior living arrangement Yes   Ability to make needs known: Good   Family able to assist with home care needs: Yes   Would you like for me to discuss the discharge plan with any other family members/significant others, and if so, who?  Yes (Nadia Grace)

## 2023-11-07 NOTE — ACP (ADVANCE CARE PLANNING)
Advance Care Planning     General Advance Care Planning (ACP) Conversation    Date of Conversation: 11/6/2023  Conducted with: Patient with Decision Making Capacity    Healthcare Decision Maker:    Primary Decision Maker: Shayan Enriquez - Spouse - [phone]  Click here to complete Healthcare Decision Makers including selection of the Healthcare Decision Maker Relationship (ie \"Primary\"). Today we documented Decision Maker(s) consistent with Legal Next of Kin hierarchy.     Content/Action Overview:  Has NO ACP documents/care preferences - requested patient complete ACP documents  Reviewed DNR/DNI and patient elects Full Code (Attempt Resuscitation)    Length of Voluntary ACP Conversation in minutes:  <16 minutes (Non-Billable)    ARLEEN Person

## 2023-11-07 NOTE — CONSULTS
Consult    Patient: Manisha Spangler MRN: [id number]     YOB: 1995  Age: 28 y.o.  Sex: female      Subjective:      Manisha Spangler is a 28 y.o. female who is being seen for pseudotumor cerebri.    The patient underwent a lumbar puncture on 11/6 for pseudotumor cerebri.  She now has severe mid back and neck pain after the procedure.  She has a minor headache which is not positional in nature.  No fevers.  A CT scan was done of the lumbar spine which does not show significant abnormalities.    Past Medical History:   Diagnosis Date    Pseudotumor      History reviewed. No pertinent surgical history.   History reviewed. No pertinent family history.  Social History     Tobacco Use    Smoking status: Never    Smokeless tobacco: Never   Substance Use Topics    Alcohol use: Not Currently      Current Facility-Administered Medications   Medication Dose Route Frequency Provider Last Rate Last Admin    promethazine (PHENERGAN) tablet 25 mg  25 mg Oral Q6H PRN Regan Chandler MD        ibuprofen (ADVIL;MOTRIN) tablet 400 mg  400 mg Oral Q6H PRN Regan Chandler MD        sodium chloride 0.9 % bolus 1,000 mL  1,000 mL IntraVENous Once Regan Chandler .9 mL/hr at 11/07/23 1241 1,000 mL at 11/07/23 1241    metaxalone (SKELAXIN) tablet 800 mg  800 mg Oral TID Shelley Badillo PA-C        sodium chloride 0.9 % bolus 100 mL  100 mL IntraVENous Once Dennis Alston DO        sodium chloride flush 0.9 % injection 10 mL  10 mL IntraVENous ONCE PRN Dennis Alston DO        acetaZOLAMIDE (DIAMOX) tablet 250 mg  250 mg Oral Daily Pau Sheppard DO   250 mg at 11/07/23 0805    sodium chloride flush 0.9 % injection 5-40 mL  5-40 mL IntraVENous 2 times per day Pau Sheppard DO   10 mL at 11/07/23 0805    sodium chloride flush 0.9 % injection 5-40 mL  5-40 mL IntraVENous PRN Pau Sheppard DO        0.9 % sodium chloride infusion   IntraVENous PRN Silver  \"lazy eye\",  \"spacing out\", reports history of intracranial hypertension    COMPARISON: None. TECHNIQUE:  Routine axial images through the head without contrast. Coronal and  sagittal reformations. Low-dose CT acquisition technique included one or more of the following options:  1. Automated exposure control, 2. Adjustment of mA and/or KV according to  patient's size and/or 3. Use of iterative reconstruction. FINDINGS:    Intracranial contents: The ventricles and cisternal spaces are normal in size, shape and configuration  for a patient of this age. Larissa Roch white differentiation is preserved. No dominant  mass, midline shift, hydrocephalus, extra-axial fluid collection or acute  hemorrhage. No asymmetric hyperdensity of the proximal major cerebral arteries. Craniocervical junction is patent. Bones and extracranial soft tissues:    Visualized paranasal sinuses and mastoid air cells are clear. Skull is intact. Visualized intraorbital contents are unremarkable. Impression  1. No acute intracranial process. MRI is more sensitive for the detection of  acute nonhemorrhagic infarct. Gerardo Jones M.D.  8/20/2023 10:42:00 PM             Assessment and Plan    59-year-old woman with mid back and cervical pain after a recent lumbar puncture. No fever. This may be musculoskeletal pain but we will get a CT scan of the head, neck, and thoracic spine. I discussed with the patient that we should not do further therapeutic lumbar punctures and proceed with shunting or bariatric surgery. There is good and growing evidence that bariatric surgery is efficacious for idiopathic intracranial hypertension and should serve as an indication for bariatric surgery.

## 2023-11-07 NOTE — ED NOTES
TRANSFER - OUT REPORT:    Verbal report given to Preethi Robin on Cyndy Skipper  being transferred to [phone] for routine progression of patient care       Report consisted of patient's Situation, Background, Assessment and   Recommendations(SBAR). Information from the following report(s) Nurse Handoff Report, Index, ED Encounter Summary, ED SBAR, Adult Overview, Intake/Output, MAR, and Recent Results was reviewed with the receiving nurse. Eldridge Fall Assessment:    Presents to emergency department  because of falls (Syncope, seizure, or loss of consciousness): No  Age > 70: No  Altered Mental Status, Intoxication with alcohol or substance confusion (Disorientation, impaired judgment, poor safety awaremess, or inability to follow instructions): No  Impaired Mobility: Ambulates or transfers with assistive devices or assistance; Unable to ambulate or transer.: No  Nursing Judgement: Yes          Lines:   Peripheral IV 11/06/23 Right Antecubital (Active)        Opportunity for questions and clarification was provided.       Patient transported with:  Violet Mcguire RN  11/06/23 [id number]

## 2023-11-07 NOTE — PROGRESS NOTES
Hospitalist Progress Note   Admit Date:  2023  6:14 PM   Name:  Lucas Starkey   Age:  29 y.o. Sex:  female  :  1995   MRN:  [id number]   Room:  [facility]    Presenting/Chief Complaint: Back Pain and Post-op Problem     Reason(s) for Admission: Intractable back pain [M54.9]  Acute midline back pain, unspecified back location [M54.9]     Hospital Course:   Ms. Michael Dumont is a 30 y/o WF with a h/o pseudotumor cerebri who was admitted to our service on  with intractable neck and back pain following large volume LP on . This happened the first time she had an LP here, came to ER but was not admitted. Labs were unremarkable. CT lumbar spine did not show evidence of hematoma or other pathology. Given numerous doses of anti-emetics and pain meds in the Er with ongoing symptoms, so we were consulted for admission. IR, Neuro consulted. Subjective & 24hr Events:   Up about to shower, feels better but still some pain in low back and neck. Doesn't endorse postural headaches but she does say her neck hurts when she sits/stands up and that this is relieved with lying down. Assessment & Plan:   # Intractable neck/back pain   - Following LP on  for pseudotumor. CT lumbar spine did not show hematoma or other process. Does not have headaches but does have postural neck pain. IR has seen, Neuro also consulted. Con't acetazolamide (per notes had not tolerated that in the past) and supportive care otherwise. # Pseudotumor cerebri    - Con't acetazolamide. Outpatient management with Dr. Tristen Hammond. She does have a referral in for Neurosurgery based on her notes. PT/OT evals and PPD needed/ordered? No  Diet:  ADULT DIET;  Regular  VTE prophylaxis: SCDs, ambulation  Code status: Full Code      Non-peripheral Lines and Tubes (if present):          Telemetry (if present):  Cardiac/Telemetry Monitor On: No        Hospital Problems:  Principal Problem:    Intractable back pain  Active 36.6

## 2023-11-07 NOTE — PROGRESS NOTES
EOS:    -pt given phenergan 1x for nausea  -pt given morphine 1x for pain  -pt given zofran 1x for nausea  -pt given oxy 1x for pain  -alternating pain and nausea meds throughout the shift to control  -pt still c/o of pain and nausea  -IR consulted  -Neurology consulted  -pt resting in bed  -no needs at this time  -vss

## 2023-11-08 PROCEDURE — 6370000000 HC RX 637 (ALT 250 FOR IP): Performed by: PHYSICIAN ASSISTANT

## 2023-11-08 PROCEDURE — 6360000002 HC RX W HCPCS: Performed by: PHYSICIAN ASSISTANT

## 2023-11-08 PROCEDURE — 2580000003 HC RX 258: Performed by: FAMILY MEDICINE

## 2023-11-08 PROCEDURE — 1100000000 HC RM PRIVATE

## 2023-11-08 PROCEDURE — 2500000003 HC RX 250 WO HCPCS: Performed by: PHYSICIAN ASSISTANT

## 2023-11-08 PROCEDURE — 2580000003 HC RX 258: Performed by: PHYSICIAN ASSISTANT

## 2023-11-08 PROCEDURE — 6370000000 HC RX 637 (ALT 250 FOR IP): Performed by: INTERNAL MEDICINE

## 2023-11-08 PROCEDURE — 6370000000 HC RX 637 (ALT 250 FOR IP): Performed by: FAMILY MEDICINE

## 2023-11-08 PROCEDURE — 99233 SBSQ HOSP IP/OBS HIGH 50: CPT | Performed by: PSYCHIATRY & NEUROLOGY

## 2023-11-08 PROCEDURE — 6360000002 HC RX W HCPCS: Performed by: FAMILY MEDICINE

## 2023-11-08 RX ORDER — ONDANSETRON 2 MG/ML
4 INJECTION INTRAMUSCULAR; INTRAVENOUS ONCE
Status: COMPLETED | OUTPATIENT
Start: 2023-11-08 | End: 2023-11-08

## 2023-11-08 RX ORDER — PROCHLORPERAZINE MALEATE 10 MG
5 TABLET ORAL EVERY 6 HOURS PRN
Status: DISCONTINUED | OUTPATIENT
Start: 2023-11-08 | End: 2023-11-14 | Stop reason: HOSPADM

## 2023-11-08 RX ORDER — MORPHINE SULFATE 2 MG/ML
2 INJECTION, SOLUTION INTRAMUSCULAR; INTRAVENOUS ONCE
Status: COMPLETED | OUTPATIENT
Start: 2023-11-08 | End: 2023-11-08

## 2023-11-08 RX ORDER — HYDROMORPHONE HYDROCHLORIDE 1 MG/ML
1 INJECTION, SOLUTION INTRAMUSCULAR; INTRAVENOUS; SUBCUTANEOUS
Status: DISCONTINUED | OUTPATIENT
Start: 2023-11-08 | End: 2023-11-09

## 2023-11-08 RX ORDER — ONDANSETRON 4 MG/1
8 TABLET, ORALLY DISINTEGRATING ORAL EVERY 8 HOURS PRN
Status: DISCONTINUED | OUTPATIENT
Start: 2023-11-08 | End: 2023-11-08

## 2023-11-08 RX ORDER — ONDANSETRON 2 MG/ML
8 INJECTION INTRAMUSCULAR; INTRAVENOUS EVERY 6 HOURS PRN
Status: DISCONTINUED | OUTPATIENT
Start: 2023-11-08 | End: 2023-11-08

## 2023-11-08 RX ORDER — SODIUM CHLORIDE 9 MG/ML
INJECTION, SOLUTION INTRAVENOUS CONTINUOUS
Status: DISCONTINUED | OUTPATIENT
Start: 2023-11-08 | End: 2023-11-13

## 2023-11-08 RX ORDER — LORAZEPAM 1 MG/1
1 TABLET ORAL ONCE
Status: COMPLETED | OUTPATIENT
Start: 2023-11-08 | End: 2023-11-08

## 2023-11-08 RX ORDER — HYDROMORPHONE HYDROCHLORIDE 1 MG/ML
1 INJECTION, SOLUTION INTRAMUSCULAR; INTRAVENOUS; SUBCUTANEOUS EVERY 4 HOURS PRN
Status: DISCONTINUED | OUTPATIENT
Start: 2023-11-08 | End: 2023-11-08

## 2023-11-08 RX ADMIN — MORPHINE SULFATE 2 MG: 2 INJECTION, SOLUTION INTRAMUSCULAR; INTRAVENOUS at 11:15

## 2023-11-08 RX ADMIN — METAXALONE 800 MG: 800 TABLET ORAL at 09:15

## 2023-11-08 RX ADMIN — METAXALONE 800 MG: 800 TABLET ORAL at 20:08

## 2023-11-08 RX ADMIN — HYDROMORPHONE HYDROCHLORIDE 1 MG: 1 INJECTION, SOLUTION INTRAMUSCULAR; INTRAVENOUS; SUBCUTANEOUS at 20:13

## 2023-11-08 RX ADMIN — OXYCODONE HYDROCHLORIDE 5 MG: 5 TABLET ORAL at 07:19

## 2023-11-08 RX ADMIN — LORAZEPAM 1 MG: 1 TABLET ORAL at 09:14

## 2023-11-08 RX ADMIN — MORPHINE SULFATE 2 MG: 2 INJECTION, SOLUTION INTRAMUSCULAR; INTRAVENOUS at 03:58

## 2023-11-08 RX ADMIN — PROMETHAZINE HYDROCHLORIDE 25 MG: 25 TABLET ORAL at 03:58

## 2023-11-08 RX ADMIN — ONDANSETRON 4 MG: 2 INJECTION INTRAMUSCULAR; INTRAVENOUS at 07:19

## 2023-11-08 RX ADMIN — PROCHLORPERAZINE MALEATE 5 MG: 10 TABLET ORAL at 20:08

## 2023-11-08 RX ADMIN — PROCHLORPERAZINE MALEATE 5 MG: 10 TABLET ORAL at 14:18

## 2023-11-08 RX ADMIN — METAXALONE 800 MG: 800 TABLET ORAL at 14:18

## 2023-11-08 RX ADMIN — MORPHINE SULFATE 2 MG: 2 INJECTION, SOLUTION INTRAMUSCULAR; INTRAVENOUS at 09:21

## 2023-11-08 RX ADMIN — SODIUM CHLORIDE, PRESERVATIVE FREE 10 ML: 5 INJECTION INTRAVENOUS at 20:14

## 2023-11-08 RX ADMIN — HYDROMORPHONE HYDROCHLORIDE 1 MG: 1 INJECTION, SOLUTION INTRAMUSCULAR; INTRAVENOUS; SUBCUTANEOUS at 23:53

## 2023-11-08 RX ADMIN — SODIUM CHLORIDE: 9 INJECTION, SOLUTION INTRAVENOUS at 22:36

## 2023-11-08 RX ADMIN — SODIUM CHLORIDE: 9 INJECTION, SOLUTION INTRAVENOUS at 11:16

## 2023-11-08 RX ADMIN — HYDROMORPHONE HYDROCHLORIDE 1 MG: 1 INJECTION, SOLUTION INTRAMUSCULAR; INTRAVENOUS; SUBCUTANEOUS at 17:11

## 2023-11-08 RX ADMIN — ONDANSETRON 4 MG: 2 INJECTION INTRAMUSCULAR; INTRAVENOUS at 10:19

## 2023-11-08 RX ADMIN — OXYCODONE HYDROCHLORIDE 5 MG: 5 TABLET ORAL at 16:19

## 2023-11-08 RX ADMIN — HYDROMORPHONE HYDROCHLORIDE 1 MG: 1 INJECTION, SOLUTION INTRAMUSCULAR; INTRAVENOUS; SUBCUTANEOUS at 14:18

## 2023-11-08 RX ADMIN — PROMETHAZINE HYDROCHLORIDE 25 MG: 25 TABLET ORAL at 12:48

## 2023-11-08 ASSESSMENT — PAIN DESCRIPTION - DESCRIPTORS
DESCRIPTORS: ACHING;DISCOMFORT
DESCRIPTORS: ACHING;DISCOMFORT
DESCRIPTORS: ACHING
DESCRIPTORS: ACHING
DESCRIPTORS: SHARP
DESCRIPTORS: ACHING
DESCRIPTORS: ACHING

## 2023-11-08 ASSESSMENT — PAIN SCALES - GENERAL
PAINLEVEL_OUTOF10: 8
PAINLEVEL_OUTOF10: 10
PAINLEVEL_OUTOF10: 8
PAINLEVEL_OUTOF10: 10
PAINLEVEL_OUTOF10: 5
PAINLEVEL_OUTOF10: 10
PAINLEVEL_OUTOF10: 5
PAINLEVEL_OUTOF10: 10
PAINLEVEL_OUTOF10: 8
PAINLEVEL_OUTOF10: 6
PAINLEVEL_OUTOF10: 9
PAINLEVEL_OUTOF10: 10
PAINLEVEL_OUTOF10: 9
PAINLEVEL_OUTOF10: 10

## 2023-11-08 ASSESSMENT — PAIN DESCRIPTION - LOCATION
LOCATION: BACK
LOCATION: NECK
LOCATION: BACK
LOCATION: NECK

## 2023-11-08 ASSESSMENT — PAIN DESCRIPTION - ONSET
ONSET: AWAKENED FROM SLEEP
ONSET: ON-GOING
ONSET: AWAKENED FROM SLEEP
ONSET: ON-GOING

## 2023-11-08 ASSESSMENT — PAIN DESCRIPTION - FREQUENCY
FREQUENCY: CONTINUOUS

## 2023-11-08 ASSESSMENT — PAIN DESCRIPTION - ORIENTATION
ORIENTATION: MID
ORIENTATION: MID
ORIENTATION: MID;LEFT;RIGHT
ORIENTATION: MID
ORIENTATION: MID
ORIENTATION: UPPER
ORIENTATION: MID
ORIENTATION: MID;LEFT;RIGHT

## 2023-11-08 ASSESSMENT — PAIN - FUNCTIONAL ASSESSMENT
PAIN_FUNCTIONAL_ASSESSMENT: PREVENTS OR INTERFERES WITH ALL ACTIVE AND SOME PASSIVE ACTIVITIES
PAIN_FUNCTIONAL_ASSESSMENT: ACTIVITIES ARE NOT PREVENTED
PAIN_FUNCTIONAL_ASSESSMENT: PREVENTS OR INTERFERES WITH ALL ACTIVE AND SOME PASSIVE ACTIVITIES
PAIN_FUNCTIONAL_ASSESSMENT: PREVENTS OR INTERFERES SOME ACTIVE ACTIVITIES AND ADLS
PAIN_FUNCTIONAL_ASSESSMENT: PREVENTS OR INTERFERES WITH ALL ACTIVE AND SOME PASSIVE ACTIVITIES
PAIN_FUNCTIONAL_ASSESSMENT: ACTIVITIES ARE NOT PREVENTED
PAIN_FUNCTIONAL_ASSESSMENT: PREVENTS OR INTERFERES WITH ALL ACTIVE AND SOME PASSIVE ACTIVITIES

## 2023-11-08 ASSESSMENT — PAIN DESCRIPTION - PAIN TYPE
TYPE: ACUTE PAIN

## 2023-11-08 NOTE — PROGRESS NOTES
Hospitalist Progress Note   Admit Date:  2023  6:14 PM   Name:  Dorcas Herrera   Age:  29 y.o. Sex:  female  :  1995   MRN:  [id number]   Room:  [facility]/    Presenting/Chief Complaint: Back Pain and Post-op Problem     Reason(s) for Admission: Intractable back pain [M54.9]  Acute midline back pain, unspecified back location [M54.9]     Hospital Course:   Ms. Zoe Braga is a 28 y/o WF with a h/o pseudotumor cerebri who was admitted to our service on  with intractable neck and back pain following large volume LP on . This happened the first time she had an LP here, came to ER but was not admitted. Labs were unremarkable. CT lumbar spine did not show evidence of hematoma or other pathology. Given numerous doses of anti-emetics and pain meds in the Er with ongoing symptoms, so we were consulted for admission. IR, Neuro consulted. Subjective & 24hr Events:    - Pt reclined in bed at 30 degrees. Mother at bedside. Pt states no new sx, but is very anxious. Discussed need for bed rest and O2, all questions addressed. Pt denies HA, nausea. Assessment & Plan:   # Intractable neck/back pain   - Following LP on  for pseudotumor. CT lumbar spine did not show hematoma or other process, but imaging does show pneumocephalus per Neurology. Does not have headaches but does have postural neck pain. IR has seen. Con't acetazolamide (per notes had not tolerated that in the past) and supportive care otherwise. - Per Neurology: bed rest, HOD at 30 degrees, avoid Valsalva maneuvers, supplemental O2 via NRB, anti-pyretics and analgesics. # Pseudotumor cerebri    - Con't acetazolamide. Outpatient management with Dr. Conchis Hampton. She does have a referral in for Neurosurgery based on her notes. PT/OT evals and PPD needed/ordered? No  Diet:  ADULT DIET;  Regular  VTE prophylaxis: SCDs, ambulation  Code status: Full Code      Non-peripheral Lines and Tubes (if present):          Telemetry

## 2023-11-08 NOTE — PROGRESS NOTES
I reviewed imaging. The patient has pneumocephalus. The following should be done:      Bed rest  Placing the patient in 30 degrees Lang position  Avoiding Valsalva maneuvers like nose-blowing, coughing, and sneezing  Analgesics and antipyretics  Osmotic diuretics, if indicated  High-flow oxygen therapy should be given (5 L per minute) via a face tent or 100% non-re-breather mask with absolute avoidance of positive pressure.

## 2023-11-08 NOTE — PROGRESS NOTES
EOS:    -pt given ibuprofen 1x for neck pain  -pt given phenergan 2x for nausea  -pt given morphine 2x for pain  -pt's neck pain still not controlled  -pt resting in bed  -no needs at this time  -vss

## 2023-11-08 NOTE — PROGRESS NOTES
Neurology Progress Note       Interval History: She continues to have severe neck pain, nausea, and constipation. CT scan of the head is consistent with pneumocephalus. Examination: Pertinent positives and negatives include:    Full strength in all 4 extremities. Hyperreflexia at the patella, bilaterally, 4+. Sustained clonus with foot dorsiflexion bilaterally. No sensory level. Assessment and Plan: 59-year-old woman with pneumocephalus after lumbar puncture. The patient's exam is also myelopathic which may represent air in the spinal canal    MRI of the cervical and thoracic spine  Bed rest  Placing the patient in 30 degrees Lang position  Avoiding Valsalva maneuvers like nose-blowing, coughing, and sneezing  Analgesics and antipyretics  Osmotic diuretics, if indicated  High-flow oxygen therapy should be given (5 L per minute) via a face tent or 100% non-re-breather mask with absolute avoidance of positive pressure. Cumulative time spent today was 50 minutes which included chart review, examining the patient, obtaining history from patient/family/other providers, reviewing imaging, and counseling the patient and/or family on medical condition.

## 2023-11-08 NOTE — CARE COORDINATION
CM reviewed and discussed pt in AM IDR. LOS 2d. Pt with pheumocephalus. Neurology recommending high-flow 02 at 5L and bed rest at 30 degrees [place] position per notes. CM will follow pt plan of care and assist with supportive care referrals pending pt clinical progress. Please consult case management if specific needs arise.

## 2023-11-09 ENCOUNTER — APPOINTMENT (OUTPATIENT)
Dept: MRI IMAGING | Age: 28
DRG: 092 | End: 2023-11-09
Payer: COMMERCIAL

## 2023-11-09 PROCEDURE — 6370000000 HC RX 637 (ALT 250 FOR IP): Performed by: PHYSICIAN ASSISTANT

## 2023-11-09 PROCEDURE — 6370000000 HC RX 637 (ALT 250 FOR IP): Performed by: INTERNAL MEDICINE

## 2023-11-09 PROCEDURE — 6370000000 HC RX 637 (ALT 250 FOR IP): Performed by: FAMILY MEDICINE

## 2023-11-09 PROCEDURE — 6360000004 HC RX CONTRAST MEDICATION: Performed by: PSYCHIATRY & NEUROLOGY

## 2023-11-09 PROCEDURE — 2580000003 HC RX 258: Performed by: INTERNAL MEDICINE

## 2023-11-09 PROCEDURE — A9579 GAD-BASE MR CONTRAST NOS,1ML: HCPCS | Performed by: PSYCHIATRY & NEUROLOGY

## 2023-11-09 PROCEDURE — 2500000003 HC RX 250 WO HCPCS: Performed by: INTERNAL MEDICINE

## 2023-11-09 PROCEDURE — 99233 SBSQ HOSP IP/OBS HIGH 50: CPT | Performed by: PSYCHIATRY & NEUROLOGY

## 2023-11-09 PROCEDURE — 1100000000 HC RM PRIVATE

## 2023-11-09 PROCEDURE — 72157 MRI CHEST SPINE W/O & W/DYE: CPT

## 2023-11-09 PROCEDURE — 2500000003 HC RX 250 WO HCPCS: Performed by: PHYSICIAN ASSISTANT

## 2023-11-09 PROCEDURE — 6360000002 HC RX W HCPCS: Performed by: INTERNAL MEDICINE

## 2023-11-09 PROCEDURE — A4216 STERILE WATER/SALINE, 10 ML: HCPCS | Performed by: INTERNAL MEDICINE

## 2023-11-09 PROCEDURE — 6360000002 HC RX W HCPCS: Performed by: FAMILY MEDICINE

## 2023-11-09 PROCEDURE — 2580000003 HC RX 258: Performed by: PHYSICIAN ASSISTANT

## 2023-11-09 PROCEDURE — 2580000003 HC RX 258: Performed by: FAMILY MEDICINE

## 2023-11-09 PROCEDURE — 72156 MRI NECK SPINE W/O & W/DYE: CPT

## 2023-11-09 RX ORDER — OXYCODONE HYDROCHLORIDE 5 MG/1
10 TABLET ORAL EVERY 6 HOURS PRN
Status: DISCONTINUED | OUTPATIENT
Start: 2023-11-09 | End: 2023-11-14 | Stop reason: HOSPADM

## 2023-11-09 RX ORDER — HYDROMORPHONE HYDROCHLORIDE 1 MG/ML
2 INJECTION, SOLUTION INTRAMUSCULAR; INTRAVENOUS; SUBCUTANEOUS EVERY 6 HOURS
Status: DISCONTINUED | OUTPATIENT
Start: 2023-11-09 | End: 2023-11-13

## 2023-11-09 RX ORDER — SODIUM CHLORIDE 0.9 % (FLUSH) 0.9 %
20 SYRINGE (ML) INJECTION AS NEEDED
Status: DISCONTINUED | OUTPATIENT
Start: 2023-11-09 | End: 2023-11-14 | Stop reason: HOSPADM

## 2023-11-09 RX ORDER — ONDANSETRON 2 MG/ML
4 INJECTION INTRAMUSCULAR; INTRAVENOUS ONCE
Status: COMPLETED | OUTPATIENT
Start: 2023-11-09 | End: 2023-11-09

## 2023-11-09 RX ORDER — CYCLOBENZAPRINE HCL 10 MG
10 TABLET ORAL 2 TIMES DAILY
Status: DISCONTINUED | OUTPATIENT
Start: 2023-11-09 | End: 2023-11-13

## 2023-11-09 RX ORDER — PROMETHAZINE HYDROCHLORIDE 25 MG/ML
6.25 INJECTION, SOLUTION INTRAMUSCULAR; INTRAVENOUS EVERY 6 HOURS PRN
Status: DISCONTINUED | OUTPATIENT
Start: 2023-11-09 | End: 2023-11-09

## 2023-11-09 RX ADMIN — SODIUM CHLORIDE, PRESERVATIVE FREE 10 ML: 5 INJECTION INTRAVENOUS at 19:28

## 2023-11-09 RX ADMIN — HYDROMORPHONE HYDROCHLORIDE 1 MG: 1 INJECTION, SOLUTION INTRAMUSCULAR; INTRAVENOUS; SUBCUTANEOUS at 07:48

## 2023-11-09 RX ADMIN — METAXALONE 800 MG: 800 TABLET ORAL at 19:25

## 2023-11-09 RX ADMIN — HYDROMORPHONE HYDROCHLORIDE 1 MG: 1 INJECTION, SOLUTION INTRAMUSCULAR; INTRAVENOUS; SUBCUTANEOUS at 11:03

## 2023-11-09 RX ADMIN — PROCHLORPERAZINE MALEATE 5 MG: 10 TABLET ORAL at 01:53

## 2023-11-09 RX ADMIN — PROCHLORPERAZINE MALEATE 5 MG: 10 TABLET ORAL at 22:14

## 2023-11-09 RX ADMIN — SODIUM CHLORIDE 0.5 MG: 9 INJECTION INTRAMUSCULAR; INTRAVENOUS; SUBCUTANEOUS at 17:08

## 2023-11-09 RX ADMIN — HYDROMORPHONE HYDROCHLORIDE 2 MG: 1 INJECTION, SOLUTION INTRAMUSCULAR; INTRAVENOUS; SUBCUTANEOUS at 16:02

## 2023-11-09 RX ADMIN — GADOTERIDOL 23 ML: 279.3 INJECTION, SOLUTION INTRAVENOUS at 18:11

## 2023-11-09 RX ADMIN — OXYCODONE HYDROCHLORIDE 5 MG: 5 TABLET ORAL at 01:53

## 2023-11-09 RX ADMIN — PROCHLORPERAZINE MALEATE 5 MG: 10 TABLET ORAL at 07:48

## 2023-11-09 RX ADMIN — OXYCODONE HYDROCHLORIDE 10 MG: 5 TABLET ORAL at 19:23

## 2023-11-09 RX ADMIN — SODIUM CHLORIDE: 9 INJECTION, SOLUTION INTRAVENOUS at 20:40

## 2023-11-09 RX ADMIN — PROCHLORPERAZINE MALEATE 5 MG: 10 TABLET ORAL at 14:58

## 2023-11-09 RX ADMIN — PROMETHAZINE HYDROCHLORIDE 6.25 MG: 25 INJECTION INTRAMUSCULAR; INTRAVENOUS at 13:28

## 2023-11-09 RX ADMIN — METAXALONE 800 MG: 800 TABLET ORAL at 14:04

## 2023-11-09 RX ADMIN — METAXALONE 800 MG: 800 TABLET ORAL at 07:54

## 2023-11-09 RX ADMIN — HYDROMORPHONE HYDROCHLORIDE 1 MG: 1 INJECTION, SOLUTION INTRAMUSCULAR; INTRAVENOUS; SUBCUTANEOUS at 14:58

## 2023-11-09 RX ADMIN — HYDROMORPHONE HYDROCHLORIDE 2 MG: 1 INJECTION, SOLUTION INTRAMUSCULAR; INTRAVENOUS; SUBCUTANEOUS at 22:14

## 2023-11-09 RX ADMIN — ONDANSETRON 4 MG: 2 INJECTION INTRAMUSCULAR; INTRAVENOUS at 03:47

## 2023-11-09 RX ADMIN — HYDROMORPHONE HYDROCHLORIDE 1 MG: 1 INJECTION, SOLUTION INTRAMUSCULAR; INTRAVENOUS; SUBCUTANEOUS at 03:47

## 2023-11-09 RX ADMIN — SODIUM CHLORIDE: 9 INJECTION, SOLUTION INTRAVENOUS at 22:22

## 2023-11-09 RX ADMIN — PROMETHAZINE HYDROCHLORIDE 6.25 MG: 25 INJECTION INTRAMUSCULAR; INTRAVENOUS at 20:41

## 2023-11-09 ASSESSMENT — PAIN DESCRIPTION - ORIENTATION
ORIENTATION: MID;LEFT;RIGHT
ORIENTATION: LEFT;RIGHT;MID
ORIENTATION: MID

## 2023-11-09 ASSESSMENT — PAIN SCALES - GENERAL
PAINLEVEL_OUTOF10: 10
PAINLEVEL_OUTOF10: 9

## 2023-11-09 ASSESSMENT — PAIN DESCRIPTION - LOCATION
LOCATION: NECK

## 2023-11-09 ASSESSMENT — PAIN DESCRIPTION - PAIN TYPE: TYPE: ACUTE PAIN

## 2023-11-09 ASSESSMENT — PAIN DESCRIPTION - ONSET: ONSET: ON-GOING

## 2023-11-09 ASSESSMENT — PAIN DESCRIPTION - FREQUENCY: FREQUENCY: CONTINUOUS

## 2023-11-09 ASSESSMENT — PAIN - FUNCTIONAL ASSESSMENT
PAIN_FUNCTIONAL_ASSESSMENT: ACTIVITIES ARE NOT PREVENTED

## 2023-11-09 ASSESSMENT — PAIN DESCRIPTION - DESCRIPTORS
DESCRIPTORS: ACHING;DISCOMFORT;PRESSURE
DESCRIPTORS: ACHING;DISCOMFORT
DESCRIPTORS: STABBING

## 2023-11-09 NOTE — PROGRESS NOTES
Neurology Progress Note       Interval History: Nausea with some improvement overnight after she was given Zofran. Her nausea continues to be severe this AM. Also continues to c/o of neck pain, but denies headaches or visual changes. Neck pain and nausea seem to be worsened as body posture becomes upright. Reporting difficulty with maintaining bed height of 30 degrees. Examination: Pertinent positives and negatives include:    Full strength in all 4 extremities. Hyperreflexia at the patella, bilaterally, 4+. Sustained clonus with foot dorsiflexion bilaterally. No sensory deficits. Pupils symmetrical and reactive to light bilaterally  Extraocular movements are intact      Assessment and Plan: 70-year-old woman with pneumocephalus after lumbar puncture. The patient's exam is also myelopathic which may represent air in the spinal canal    MRI of the cervical and thoracic spine  Bed rest  Placing the patient in 30 degrees Lang position  Avoiding Valsalva maneuvers like nose-blowing, coughing, and sneezing  Analgesics and antipyretics  Antiemetics as needed. Recommend transition to full liquid diet for improved PO intake. Consider scopolamine patch in conjunction with current IV antiemetic regimen. Osmotic diuretics, if indicated  High-flow oxygen therapy should be given (5 L per minute) via a face tent or 100% non-re-breather mask with absolute avoidance of positive pressure. Cumulative time spent today was 50 minutes which included chart review, examining the patient, obtaining history from patient/family/other providers, reviewing imaging, and counseling the patient and/or family on medical condition.

## 2023-11-09 NOTE — PROGRESS NOTES
END OF SHIFT SUMMARY:    Additional events this shift:   -dilaudid given x3   -oxycodone x1  -compazine x2  -zofran given x1  -Janet Mcneal MD notified about compazine ineffectiveness, one time dose of zofran ordered    Ladi Jennings RN

## 2023-11-09 NOTE — PROGRESS NOTES
Physician Progress Note      PATIENT:               Zack Cazares  CSN #:                  [id number]  :                       1995  ADMIT DATE:       2023 6:14 PM  [facility] DATE:  RESPONDING  PROVIDER #: Tao Ren MD          QUERY TEXT:    Patient admitted with BMI 42. If possible, please document in progress notes   and discharge summary if you are evaluating and /or treating any of the   following: The medical record reflects the following:  Risk Factors: Pseudotumor cerebri    Clinical Indicators: Patient admitted with BMI of 42. Noted on  neuro consult, \"I discussed with the patient that we should not   do further therapeutic lumbar punctures and proceed with shunting or bariatric   surgery. There is good and growing evidence that bariatric surgery is   efficacious for idiopathic intracranial hypertension and should serve as an   indication for bariatric surgery. \"    Treatment: Full liquid diet    ? Specificity of obesity and morbid obesity should be reported based on   physician documentation, as there are several published classifications and   definitions?  MS-DRG Training Guide. CDC:   https://cook-brown.info/. WHO:   http://soto.biz/. NIH:   LargeFood.be  Options provided:  -- Morbid obesity  -- Other - I will add my own diagnosis  -- Disagree - Not applicable / Not valid  -- Disagree - Clinically unable to determine / Unknown  -- Refer to Clinical Documentation Reviewer    PROVIDER RESPONSE TEXT:    This patient has morbid obesity. Query created by:  Lorrie Luciano on 2023 12:19 PM      Electronically signed by:  Tao Ren MD 2023 1:03 PM

## 2023-11-09 NOTE — PROGRESS NOTES
Hospitalist Progress Note   Admit Date:  2023  6:14 PM   Name:  Melchor Apgar   Age:  29 y.o. Sex:  female  :  1995   MRN:  [id number]   Room:  [facility]    Presenting/Chief Complaint: Back Pain and Post-op Problem     Reason(s) for Admission: Intractable back pain [M54.9]  Acute midline back pain, unspecified back location [M54.9]     Hospital Course:   Melchor Apgar is a 29 y.o. female with medical history of  pseudotumor cerebri who was admitted to our service on  with intractable neck and back pain following large volume LP on . There is concern for possible pneumocephalus given her symptoms. Neurology is on board and patient is being treated aggressively for her symptoms. Subjective & 24hr Events:   Patient reports that she is very nauseous this morning. Compazine has not been working and she is requesting Phenergan. Patient also states that her headache is positional and she is having some muscle tightness around her neck. Denies any fevers, chills, shortness of breath, chest pain, blurry vision. Assessment & Plan:     Pneumocephalus status post LP  Intractable back/cervical pain  Patient signs symptoms consistent with pneumocephalus. Neurology following and recommendations seen and noted. -Follow-up MRI of the cervical and thoracic spine  -Continue with bedrest, head of bed elevated 30 degrees  -Avoid Valsalva maneuvers  -Continue with high flow oxygen  -Pain control as needed - PRN dilaudid, oxycodone  -Start Flexeril 10 mg twice daily    Pseudotumor cerebri  Known diagnosis. Currently being managed as outpatient with Dr. Johnathon Jack of neurology.  -Continue with acetazolamide  -May require bariatric surgery versus shunt placement as outpatient given persistent symptoms        PT/OT evals and PPD needed/ordered? No  Diet:  ADULT DIET;  Full Liquid  VTE prophylaxis: SCD's   Code status: Full Code      Non-peripheral Lines and Tubes (if present):

## 2023-11-10 LAB
ALB CSF/SERPL: 3 (ref 0–8)
ALBUMIN CSF-MCNC: 15 MG/DL (ref 7–29)
ALBUMIN SERPL-MCNC: 4.6 G/DL (ref 4–5)
ANION GAP SERPL CALC-SCNC: 4 MMOL/L (ref 2–11)
BASOPHILS # BLD: 0 K/UL (ref 0–0.2)
BASOPHILS NFR BLD: 0 % (ref 0–2)
BUN SERPL-MCNC: 6 MG/DL (ref 6–23)
CALCIUM SERPL-MCNC: 8.2 MG/DL (ref 8.3–10.4)
CHLORIDE SERPL-SCNC: 113 MMOL/L (ref 101–110)
CO2 SERPL-SCNC: 25 MMOL/L (ref 21–32)
CREAT SERPL-MCNC: 0.7 MG/DL (ref 0.6–1)
DIFFERENTIAL METHOD BLD: NORMAL
EOSINOPHIL # BLD: 0.2 K/UL (ref 0–0.8)
EOSINOPHIL NFR BLD: 3 % (ref 0.5–7.8)
ERYTHROCYTE [DISTWIDTH] IN BLOOD BY AUTOMATED COUNT: 13.1 % (ref 11.9–14.6)
GLUCOSE SERPL-MCNC: 124 MG/DL (ref 65–100)
HCT VFR BLD AUTO: 36.7 % (ref 35.8–46.3)
HGB BLD-MCNC: 12.2 G/DL (ref 11.7–15.4)
IGG CSF-MCNC: 1.2 MG/DL (ref 0–6.7)
IGG SERPL-MCNC: 784 MG/DL (ref 586–1602)
IGG SYNTH RATE SER+CSF CALC-MRATE: NORMAL MG/DAY
IGG/ALB CLEAR SER+CSF-RTO: 0.5 (ref 0–0.7)
IGG/ALB CSF: 0.08 (ref 0–0.25)
IMM GRANULOCYTES # BLD AUTO: 0 K/UL (ref 0–0.5)
IMM GRANULOCYTES NFR BLD AUTO: 0 % (ref 0–5)
LYMPHOCYTES # BLD: 2.2 K/UL (ref 0.5–4.6)
LYMPHOCYTES NFR BLD: 28 % (ref 13–44)
MCH RBC QN AUTO: 29.3 PG (ref 26.1–32.9)
MCHC RBC AUTO-ENTMCNC: 33.2 G/DL (ref 31.4–35)
MCV RBC AUTO: 88.2 FL (ref 82–102)
MONOCYTES # BLD: 0.5 K/UL (ref 0.1–1.3)
MONOCYTES NFR BLD: 6 % (ref 4–12)
NEUTS SEG # BLD: 4.9 K/UL (ref 1.7–8.2)
NEUTS SEG NFR BLD: 63 % (ref 43–78)
NRBC # BLD: 0 K/UL (ref 0–0.2)
OLIGOCLONAL BANDS.IT SER+CSF QL: NORMAL
PLATELET # BLD AUTO: 212 K/UL (ref 150–450)
PMV BLD AUTO: 11.7 FL (ref 9.4–12.3)
POTASSIUM SERPL-SCNC: 3.3 MMOL/L (ref 3.5–5.1)
RBC # BLD AUTO: 4.16 M/UL (ref 4.05–5.2)
SODIUM SERPL-SCNC: 142 MMOL/L (ref 133–143)
WBC # BLD AUTO: 7.9 K/UL (ref 4.3–11.1)

## 2023-11-10 PROCEDURE — 2500000003 HC RX 250 WO HCPCS: Performed by: INTERNAL MEDICINE

## 2023-11-10 PROCEDURE — 85025 COMPLETE CBC W/AUTO DIFF WBC: CPT

## 2023-11-10 PROCEDURE — 2580000003 HC RX 258: Performed by: PHYSICIAN ASSISTANT

## 2023-11-10 PROCEDURE — 1100000000 HC RM PRIVATE

## 2023-11-10 PROCEDURE — 36415 COLL VENOUS BLD VENIPUNCTURE: CPT

## 2023-11-10 PROCEDURE — 80048 BASIC METABOLIC PNL TOTAL CA: CPT

## 2023-11-10 PROCEDURE — 6370000000 HC RX 637 (ALT 250 FOR IP): Performed by: PHYSICIAN ASSISTANT

## 2023-11-10 PROCEDURE — 6370000000 HC RX 637 (ALT 250 FOR IP): Performed by: FAMILY MEDICINE

## 2023-11-10 PROCEDURE — 2580000003 HC RX 258: Performed by: FAMILY MEDICINE

## 2023-11-10 PROCEDURE — 2580000003 HC RX 258: Performed by: INTERNAL MEDICINE

## 2023-11-10 PROCEDURE — 6360000002 HC RX W HCPCS: Performed by: INTERNAL MEDICINE

## 2023-11-10 PROCEDURE — 6370000000 HC RX 637 (ALT 250 FOR IP): Performed by: INTERNAL MEDICINE

## 2023-11-10 PROCEDURE — 99233 SBSQ HOSP IP/OBS HIGH 50: CPT | Performed by: PSYCHIATRY & NEUROLOGY

## 2023-11-10 RX ORDER — LACTULOSE 10 G/15ML
10 SOLUTION ORAL 2 TIMES DAILY
Status: DISPENSED | OUTPATIENT
Start: 2023-11-10 | End: 2023-11-11

## 2023-11-10 RX ORDER — LACTULOSE 10 G/15ML
20 SOLUTION ORAL 2 TIMES DAILY PRN
Status: DISCONTINUED | OUTPATIENT
Start: 2023-11-10 | End: 2023-11-14 | Stop reason: HOSPADM

## 2023-11-10 RX ADMIN — PROMETHAZINE HYDROCHLORIDE 6.25 MG: 25 INJECTION INTRAMUSCULAR; INTRAVENOUS at 12:10

## 2023-11-10 RX ADMIN — SODIUM CHLORIDE: 9 INJECTION, SOLUTION INTRAVENOUS at 20:43

## 2023-11-10 RX ADMIN — SODIUM CHLORIDE, PRESERVATIVE FREE 10 ML: 5 INJECTION INTRAVENOUS at 20:48

## 2023-11-10 RX ADMIN — METAXALONE 800 MG: 800 TABLET ORAL at 13:57

## 2023-11-10 RX ADMIN — OXYCODONE HYDROCHLORIDE 10 MG: 5 TABLET ORAL at 16:12

## 2023-11-10 RX ADMIN — HYDROMORPHONE HYDROCHLORIDE 2 MG: 1 INJECTION, SOLUTION INTRAMUSCULAR; INTRAVENOUS; SUBCUTANEOUS at 18:12

## 2023-11-10 RX ADMIN — METAXALONE 800 MG: 800 TABLET ORAL at 08:40

## 2023-11-10 RX ADMIN — SODIUM CHLORIDE, PRESERVATIVE FREE 10 ML: 5 INJECTION INTRAVENOUS at 08:40

## 2023-11-10 RX ADMIN — HYDROMORPHONE HYDROCHLORIDE 2 MG: 1 INJECTION, SOLUTION INTRAMUSCULAR; INTRAVENOUS; SUBCUTANEOUS at 23:58

## 2023-11-10 RX ADMIN — LACTULOSE 10 G: 20 SOLUTION ORAL at 13:57

## 2023-11-10 RX ADMIN — HYDROMORPHONE HYDROCHLORIDE 2 MG: 1 INJECTION, SOLUTION INTRAMUSCULAR; INTRAVENOUS; SUBCUTANEOUS at 12:09

## 2023-11-10 RX ADMIN — METAXALONE 800 MG: 800 TABLET ORAL at 20:39

## 2023-11-10 RX ADMIN — PROCHLORPERAZINE MALEATE 5 MG: 10 TABLET ORAL at 08:40

## 2023-11-10 RX ADMIN — HYDROMORPHONE HYDROCHLORIDE 2 MG: 1 INJECTION, SOLUTION INTRAMUSCULAR; INTRAVENOUS; SUBCUTANEOUS at 05:10

## 2023-11-10 ASSESSMENT — PAIN DESCRIPTION - DESCRIPTORS: DESCRIPTORS: ACHING;DISCOMFORT

## 2023-11-10 ASSESSMENT — PAIN - FUNCTIONAL ASSESSMENT: PAIN_FUNCTIONAL_ASSESSMENT: ACTIVITIES ARE NOT PREVENTED

## 2023-11-10 ASSESSMENT — PAIN SCALES - GENERAL
PAINLEVEL_OUTOF10: 0
PAINLEVEL_OUTOF10: 10
PAINLEVEL_OUTOF10: 10
PAINLEVEL_OUTOF10: 5
PAINLEVEL_OUTOF10: 10
PAINLEVEL_OUTOF10: 9
PAINLEVEL_OUTOF10: 0

## 2023-11-10 ASSESSMENT — PAIN DESCRIPTION - LOCATION: LOCATION: NECK

## 2023-11-10 ASSESSMENT — PAIN DESCRIPTION - ORIENTATION: ORIENTATION: MID;LEFT;RIGHT

## 2023-11-10 NOTE — PROGRESS NOTES
Received a call from on-call radiologist.  Abnormal enhancement at T6 on MRI concerning for signs of infection especially given timing of LP. May consider repeat LP if there is concern for infection. Patient's vital signs stable, WBC WNL.

## 2023-11-10 NOTE — PROGRESS NOTES
Patient mother and patient becoming increasingly anxious and unsure of neurology plan or dx. Patient and family feel like no one is advocating for patient. Contacted supervisor and patient relations, this nurse left voicemail for patient relations and provided number to family, instructed them to call with any concern. Notified Neurology and hospitalist of patient and family possibly wanting to transfer hospitals and treatment concerns. Patient and family agree to stay overnight and discuss plan with all providers in AM. Relayed current plan from neurology to patient and family at bedside.

## 2023-11-10 NOTE — PROGRESS NOTES
Hospitalist Progress Note   Admit Date:  2023  6:14 PM   Name:  Cyndy Johnson   Age:  29 y.o. Sex:  female  :  1995   MRN:  [id number]   Room:  [facility]    Presenting/Chief Complaint: Back Pain and Post-op Problem     Reason(s) for Admission: Intractable back pain [M54.9]  Acute midline back pain, unspecified back location [M54.9]     Hospital Course:   Cyndy Johnson is a 29 y.o. female with medical history of  pseudotumor cerebri who was admitted to our service on  with intractable neck and back pain following large volume LP on . There is concern for possible pneumocephalus given her symptoms. Neurology is on board and patient is being treated aggressively for her symptoms. MRI completed on  shows findings that are concerning for possible infection superimposed upon a CSF leak related to the LP. As per neurology recommendations, patient to have repeat LP to rule out infection at this time. Subjective & 24hr Events:   Patient was seen evaluated at bedside this morning. She appears to be very agitated and upset. She reports she had a very bad experience with MRI last night. She is growing impatient about the care that she has been receiving. She states that she has significant neck pain and her headache is somewhat improved with the increase in pain meds. Denies any fevers, chills, shortness of breath, chest pain. Nausea still intermittent but better. Assessment & Plan:     Pneumocephalus status post LP  Intractable back/cervical pain  Patient signs symptoms consistent with pneumocephalus. MEENU showing some irregularities and abnormal enhancement concerning for possible infection superimposed upon CSF leak. Neurology following and recommendations seen and noted.   - Follow-up repeat LP  -Continue with bedrest, head of bed elevated 30 degrees  -Avoid Valsalva maneuvers  -Continue with high flow oxygen  -Pain control as needed - PRN dilaudid,

## 2023-11-10 NOTE — PROGRESS NOTES
Neurology Progress Note       Interval History: Less nauseous this AM, but still endorsing pain in neck. Pain worse with seated or standing position. Now endorsing headache localizing to behind bilateral eyes, she states this is similar to her IIH related headaches. Examination: Pertinent positives and negatives include:    Full strength in all 4 extremities. Hyperreflexia at the patella, bilaterally, 4+. Sustained clonus with foot dorsiflexion bilaterally. BLE trembling that resolves with distraction or intention    No sensory deficits. Pupils symmetrical and reactive to light bilaterally  Extraocular movements are intact      Assessment and Plan: 43-year-old woman with pneumocephalus after lumbar puncture. The patient's exam is also myelopathic, this is secondary to recent MRI findings consistent with epidural fluid from C3 through thoracic spine that is resulting in moderated degree of thecal sac narrowing. There is some concern for infection per radiology based on this imaging. Low suspicion for infection as she has not demonstrated fevers, meningeal signs, leukocytosis, or other sign of infection.         -Bed rest  -Placing the patient in 30 degrees Lang position  -Avoiding Valsalva maneuvers like nose-blowing, coughing, and sneezing  -Analgesics and antipyretics  -Antiemetics as needed.  -Osmotic diuretics, if indicated  -High-flow oxygen therapy should be given (5 L per minute) via a face tent or 100% non-re-breather mask with absolute avoidance of positive pressure. -ID has been contacted to determine if empiric treatment may be needed. We may need to repeat LP to rule out infectious etiologies.    -Neurosurgery has been contacted and asked to review imaging to assess need for surgical intervention      Cumulative time spent today was 50 minutes which included chart review, examining the patient, obtaining history from patient/family/other providers, reviewing imaging, and counseling the

## 2023-11-10 NOTE — PROGRESS NOTES
I reviewed the patient's imaging which shows an epidural fluid collection in the thoracic spine resulting in a moderate degree of thecal sac narrowing. Per radiology, there is some concern for infection given the appearance on imaging. She has not had fevers or other signs of infection. Her symptoms are mostly related to pain. I have asked neurosurgery to review imaging to make sure that there is nothing surgical.  Unfortunately, we may need repeat CSF testing to rule out infectious etiologies. I have consulted infectious disease to see if we need to empirically treat the patient with antibiotics as are waiting.

## 2023-11-10 NOTE — PROGRESS NOTES
END OF SHIFT SUMMARY:    Additional events this shift:   -Given compazine x1  -Given phenergan x1  -Given oxycodone x1  -Family at bedside  -Mandi Chung MD aware of MRI results    Joel Eaton RN

## 2023-11-10 NOTE — PROGRESS NOTES
On 5L. MRI findings consistent with epidural fluid from C3 through thoracic spine that is resulting in moderated degree of thecal sac narrowing. Pt may need repeat CSF testing to rule out infectious etiologies. ID consulted.

## 2023-11-10 NOTE — PROGRESS NOTES
It is with great yanely that we pray for your family today:          \" [place] is my rock,   My God is my rock, in whom I find protection. He is my shield, the power that saves me,  And my place of safety. \"    Lio Ambriz    In my distress I called upon your name    And my cry came directly into your ear. You were moved with compassion    As the one you love was in distress. You came down upon a cloud    And rescued me. I am forever grateful. Ana Rosa Jones -  [phone]

## 2023-11-11 LAB
ANION GAP SERPL CALC-SCNC: 5 MMOL/L (ref 2–11)
BACTERIA SPEC CULT: NORMAL
BASOPHILS # BLD: 0 K/UL (ref 0–0.2)
BASOPHILS NFR BLD: 0 % (ref 0–2)
BUN SERPL-MCNC: 4 MG/DL (ref 6–23)
CALCIUM SERPL-MCNC: 8.2 MG/DL (ref 8.3–10.4)
CHLORIDE SERPL-SCNC: 111 MMOL/L (ref 101–110)
CO2 SERPL-SCNC: 24 MMOL/L (ref 21–32)
CREAT SERPL-MCNC: 0.7 MG/DL (ref 0.6–1)
DIFFERENTIAL METHOD BLD: NORMAL
EOSINOPHIL # BLD: 0.3 K/UL (ref 0–0.8)
EOSINOPHIL NFR BLD: 4 % (ref 0.5–7.8)
ERYTHROCYTE [DISTWIDTH] IN BLOOD BY AUTOMATED COUNT: 13.2 % (ref 11.9–14.6)
GLUCOSE SERPL-MCNC: 92 MG/DL (ref 65–100)
GRAM STN SPEC: NORMAL
GRAM STN SPEC: NORMAL
HCT VFR BLD AUTO: 35.8 % (ref 35.8–46.3)
HGB BLD-MCNC: 11.8 G/DL (ref 11.7–15.4)
IMM GRANULOCYTES # BLD AUTO: 0 K/UL (ref 0–0.5)
IMM GRANULOCYTES NFR BLD AUTO: 0 % (ref 0–5)
LYMPHOCYTES # BLD: 2.3 K/UL (ref 0.5–4.6)
LYMPHOCYTES NFR BLD: 33 % (ref 13–44)
MCH RBC QN AUTO: 28.9 PG (ref 26.1–32.9)
MCHC RBC AUTO-ENTMCNC: 33 G/DL (ref 31.4–35)
MCV RBC AUTO: 87.5 FL (ref 82–102)
MONOCYTES # BLD: 0.5 K/UL (ref 0.1–1.3)
MONOCYTES NFR BLD: 7 % (ref 4–12)
NEUTS SEG # BLD: 3.9 K/UL (ref 1.7–8.2)
NEUTS SEG NFR BLD: 56 % (ref 43–78)
NRBC # BLD: 0 K/UL (ref 0–0.2)
PLATELET # BLD AUTO: 209 K/UL (ref 150–450)
PMV BLD AUTO: 11.7 FL (ref 9.4–12.3)
POTASSIUM SERPL-SCNC: 3.3 MMOL/L (ref 3.5–5.1)
RBC # BLD AUTO: 4.09 M/UL (ref 4.05–5.2)
SERVICE CMNT-IMP: NORMAL
SODIUM SERPL-SCNC: 140 MMOL/L (ref 133–143)
WBC # BLD AUTO: 6.9 K/UL (ref 4.3–11.1)

## 2023-11-11 PROCEDURE — 2580000003 HC RX 258: Performed by: FAMILY MEDICINE

## 2023-11-11 PROCEDURE — 6370000000 HC RX 637 (ALT 250 FOR IP): Performed by: PHYSICIAN ASSISTANT

## 2023-11-11 PROCEDURE — 6370000000 HC RX 637 (ALT 250 FOR IP): Performed by: INTERNAL MEDICINE

## 2023-11-11 PROCEDURE — 36415 COLL VENOUS BLD VENIPUNCTURE: CPT

## 2023-11-11 PROCEDURE — 2580000003 HC RX 258: Performed by: PHYSICIAN ASSISTANT

## 2023-11-11 PROCEDURE — 2500000003 HC RX 250 WO HCPCS: Performed by: INTERNAL MEDICINE

## 2023-11-11 PROCEDURE — 85025 COMPLETE CBC W/AUTO DIFF WBC: CPT

## 2023-11-11 PROCEDURE — 99233 SBSQ HOSP IP/OBS HIGH 50: CPT | Performed by: PSYCHIATRY & NEUROLOGY

## 2023-11-11 PROCEDURE — 80048 BASIC METABOLIC PNL TOTAL CA: CPT

## 2023-11-11 PROCEDURE — 1100000000 HC RM PRIVATE

## 2023-11-11 RX ADMIN — HYDROMORPHONE HYDROCHLORIDE 2 MG: 1 INJECTION, SOLUTION INTRAMUSCULAR; INTRAVENOUS; SUBCUTANEOUS at 17:47

## 2023-11-11 RX ADMIN — OXYCODONE HYDROCHLORIDE 10 MG: 5 TABLET ORAL at 09:17

## 2023-11-11 RX ADMIN — OXYCODONE HYDROCHLORIDE 10 MG: 5 TABLET ORAL at 03:45

## 2023-11-11 RX ADMIN — SODIUM CHLORIDE, PRESERVATIVE FREE 10 ML: 5 INJECTION INTRAVENOUS at 09:17

## 2023-11-11 RX ADMIN — HYDROMORPHONE HYDROCHLORIDE 2 MG: 1 INJECTION, SOLUTION INTRAMUSCULAR; INTRAVENOUS; SUBCUTANEOUS at 23:23

## 2023-11-11 RX ADMIN — HYDROMORPHONE HYDROCHLORIDE 2 MG: 1 INJECTION, SOLUTION INTRAMUSCULAR; INTRAVENOUS; SUBCUTANEOUS at 12:06

## 2023-11-11 RX ADMIN — PROCHLORPERAZINE MALEATE 5 MG: 10 TABLET ORAL at 09:17

## 2023-11-11 RX ADMIN — METAXALONE 800 MG: 800 TABLET ORAL at 09:17

## 2023-11-11 RX ADMIN — SODIUM CHLORIDE, PRESERVATIVE FREE 10 ML: 5 INJECTION INTRAVENOUS at 20:32

## 2023-11-11 RX ADMIN — METAXALONE 800 MG: 800 TABLET ORAL at 15:00

## 2023-11-11 RX ADMIN — METAXALONE 800 MG: 800 TABLET ORAL at 20:32

## 2023-11-11 RX ADMIN — SODIUM CHLORIDE: 9 INJECTION, SOLUTION INTRAVENOUS at 23:23

## 2023-11-11 RX ADMIN — PROCHLORPERAZINE MALEATE 5 MG: 10 TABLET ORAL at 20:47

## 2023-11-11 RX ADMIN — HYDROMORPHONE HYDROCHLORIDE 2 MG: 1 INJECTION, SOLUTION INTRAMUSCULAR; INTRAVENOUS; SUBCUTANEOUS at 06:16

## 2023-11-11 RX ADMIN — OXYCODONE HYDROCHLORIDE 10 MG: 5 TABLET ORAL at 20:47

## 2023-11-11 ASSESSMENT — PAIN SCALES - GENERAL
PAINLEVEL_OUTOF10: 10
PAINLEVEL_OUTOF10: 7
PAINLEVEL_OUTOF10: 7
PAINLEVEL_OUTOF10: 10
PAINLEVEL_OUTOF10: 10
PAINLEVEL_OUTOF10: 6
PAINLEVEL_OUTOF10: 10
PAINLEVEL_OUTOF10: 0

## 2023-11-11 ASSESSMENT — PAIN DESCRIPTION - LOCATION
LOCATION: NECK

## 2023-11-11 ASSESSMENT — PAIN DESCRIPTION - PAIN TYPE
TYPE: ACUTE PAIN
TYPE: ACUTE PAIN

## 2023-11-11 ASSESSMENT — PAIN - FUNCTIONAL ASSESSMENT
PAIN_FUNCTIONAL_ASSESSMENT: PREVENTS OR INTERFERES SOME ACTIVE ACTIVITIES AND ADLS
PAIN_FUNCTIONAL_ASSESSMENT: PREVENTS OR INTERFERES SOME ACTIVE ACTIVITIES AND ADLS

## 2023-11-11 ASSESSMENT — PAIN DESCRIPTION - ORIENTATION
ORIENTATION: POSTERIOR
ORIENTATION: POSTERIOR

## 2023-11-11 ASSESSMENT — PAIN DESCRIPTION - ONSET
ONSET: GRADUAL
ONSET: ON-GOING

## 2023-11-11 ASSESSMENT — PAIN DESCRIPTION - DESCRIPTORS
DESCRIPTORS: DISCOMFORT;SQUEEZING
DESCRIPTORS: ACHING;SQUEEZING

## 2023-11-11 ASSESSMENT — PAIN DESCRIPTION - FREQUENCY
FREQUENCY: CONTINUOUS
FREQUENCY: CONTINUOUS

## 2023-11-11 NOTE — PROGRESS NOTES
Hospitalist Progress Note   Admit Date:  2023  6:14 PM   Name:  Manisha Spangler   Age:  28 y.o.  Sex:  female  :  1995   MRN:  [id number]   Room:  [facility]    Presenting/Chief Complaint: Back Pain and Post-op Problem     Reason(s) for Admission: Intractable back pain [M54.9]  Acute midline back pain, unspecified back location [M54.9]     Hospital Course:   Manisha Spangler is a 28 y.o. female with medical history of  pseudotumor cerebri who was admitted to our service on  with intractable neck and back pain following large volume LP on .  There is concern for possible pneumocephalus given her symptoms.  Neurology is on board and patient is being treated aggressively for her symptoms.    MRI completed on  shows findings that are concerning for possible infection superimposed upon a CSF leak related to the LP.  ID, neurosurgery were consulted.  Based on patient's clinical presentation, low clinical suspicion of infection at this time.  Repeat LP not required.      Subjective & 24hr Events:   Patient was seen evaluated at bedside this morning.   was at bedside.  Patient reports that she still having significant neck pain but anxiety is improved.  Denies any significant sensory loss or deficits.  No other issues noted.  Mood appears to be improved      Assessment & Plan:     Pneumocephalus status post LP  Intractable back/cervical pain  CSF leak  Patient signs symptoms consistent with pneumocephalus and CSF leak with some mass effect.  Neurosurgery evaluation seen and noted.    NEurology following and recommendations seen and noted.  -Continue with bedrest, head of bed elevated 30 degrees  -Avoid Valsalva maneuvers  -Continue with high flow oxygen  -Pain control as needed - PRN dilaudid, oxycodone  -Continue Flexeril 10 mg twice daily  - No antibiotics required at this time  -We will observe over the weekend if symptoms do not improve, can consider blood patch and  midline   CV:   RRR. No m/r/g. No jugular venous distension. Lungs:   CTAB. No wheezing, rhonchi, or rales. Symmetric expansion. Abdomen:   Soft, nontender, nondistended. Extremities: No cyanosis or clubbing. No edema  Skin:     No rashes and normal coloration. Warm and dry. Neuro:  CN II-XII grossly intact. Psych:  Normal mood and affect.       I have personally reviewed labs and tests:  Recent Labs:  Recent Results (from the past 48 hour(s))   CBC with Auto Differential    Collection Time: 11/10/23  7:44 AM   Result Value Ref Range    WBC 7.9 4.3 - 11.1 K/uL    RBC 4.16 4.05 - 5.2 M/uL    Hemoglobin 12.2 11.7 - 15.4 g/dL    Hematocrit 36.7 35.8 - 46.3 %    MCV 88.2 82 - 102 FL    MCH 29.3 26.1 - 32.9 PG    MCHC 33.2 31.4 - 35.0 g/dL    RDW 13.1 11.9 - 14.6 %    Platelets 679 862 - 721 K/uL    MPV 11.7 9.4 - 12.3 FL    nRBC 0.00 0.0 - 0.2 K/uL    Differential Type AUTOMATED      Neutrophils % 63 43 - 78 %    Lymphocytes % 28 13 - 44 %    Monocytes % 6 4.0 - 12.0 %    Eosinophils % 3 0.5 - 7.8 %    Basophils % 0 0.0 - 2.0 %    Immature Granulocytes 0 0.0 - 5.0 %    Neutrophils Absolute 4.9 1.7 - 8.2 K/UL    Lymphocytes Absolute 2.2 0.5 - 4.6 K/UL    Monocytes Absolute 0.5 0.1 - 1.3 K/UL    Eosinophils Absolute 0.2 0.0 - 0.8 K/UL    Basophils Absolute 0.0 0.0 - 0.2 K/UL    Absolute Immature Granulocyte 0.0 0.0 - 0.5 K/UL   Basic Metabolic Panel    Collection Time: 11/10/23  7:44 AM   Result Value Ref Range    Sodium 142 133 - 143 mmol/L    Potassium 3.3 (L) 3.5 - 5.1 mmol/L    Chloride 113 (H) 101 - 110 mmol/L    CO2 25 21 - 32 mmol/L    Anion Gap 4 2 - 11 mmol/L    Glucose 124 (H) 65 - 100 mg/dL    BUN 6 6 - 23 MG/DL    Creatinine 0.70 0.6 - 1.0 MG/DL    Est, Glom Filt Rate >60 >60 ml/min/1.73m2    Calcium 8.2 (L) 8.3 - 10.4 MG/DL   CBC with Auto Differential    Collection Time: 11/11/23  6:14 AM   Result Value Ref Range    WBC 6.9 4.3 - 11.1 K/uL    RBC 4.09 4.05 - 5.2 M/uL    Hemoglobin 11.8 11.7 -

## 2023-11-11 NOTE — PROGRESS NOTES
Pt with complaints of a headache this am, pt states she has not rested this shift and has been uncomfortable, despite pain medication,

## 2023-11-11 NOTE — PROGRESS NOTES
Neurology Progress Note       Interval History: The patient continues to have occasional neck pain but overall, is doing better. She appears to be in less pain today while talking to her at the bedside. Examination: Pertinent positives and negatives include:    Neurological examination continues to show 4+ reflexes in the bilateral lower extremities with sustained clonus with foot dorsiflexion. Strength is completely normal.  No sensory loss. Assessment and Plan: 60-year-old woman with a CSF leak after lumbar puncture for idiopathic intracranial hypertension. She has compression on her thoracic spine from CSF accumulation in the thecal sac which is causing some myelopathic changes on her examination, but no weakness. If the patient develops weakness then I recommend a lumbar drain. Otherwise, we will see how she does through the weekend. If she remains symptomatic then I recommend an epidural blood patch on Monday. There is no sign of infection. Cumulative time spent today was 50 minutes which included chart review, examining the patient, obtaining history from patient/family/other providers, reviewing imaging, and counseling the patient and/or family on medical condition.

## 2023-11-11 NOTE — CONSULTS
Neurosurgery Consult    Patient: Jeramy Onofre MRN: [id number]     YOB: 1995  Age: 29 y.o. Sex: female      Subjective:      Jeramy Onofre is a 29 y.o. female who is being seen for pseudotumor cerebri. The patient underwent a lumbar puncture on 11/6 for pseudotumor cerebri. She now has severe mid back and neck pain after the procedure. She has a headache with neck and thoracic pain. Pt was out of bed last evening to shower. Denies any issues with bowel and bladder control. No leg weakness. No fevers. A CT scan was done of the lumbar spine which does not show significant abnormalities. MRI imaging with an epidural pseudomeningocele. Ct head with small areas of pneumocephalus. 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 Prolonged medical treatment with initial response that now seems to be ineffective. Past Medical History:   Diagnosis Date    Pseudotumor      History reviewed. No pertinent surgical history. History reviewed. No pertinent family history.   Social History     Tobacco Use    Smoking status: Never    Smokeless tobacco: Never   Substance Use Topics    Alcohol use: Not Currently      Current Facility-Administered Medications   Medication Dose Route Frequency Provider Last Rate Last Admin    lactulose (CHRONULAC) 10 GM/15ML solution 20 g  20 g Oral BID PRN Stephen Virgen MD        LORazepam (ATIVAN) 1 mg in sodium chloride (PF) 0.9 % 10 mL injection  1 mg IntraVENous Once PRN Stephen Virgen MD        promethazine (PHENERGAN) 6.25 mg in sodium chloride 0.9 % 50 mL IVPB  6.25 mg IntraVENous Q6H PRN Stephen Virgen MD   Stopped at 11/10/23 1225    cyclobenzaprine (FLEXERIL) tablet 10 mg  10 mg Oral BID Stephen Virgen MD        HYDROmorphone HCl PF (DILAUDID) injection 2 mg  2 mg IntraVENous Q6H Stephen Virgen MD   2 mg at 11/11/23 0616    oxyCODONE (ROXICODONE) immediate release tablet 10 mg  10 mg Oral Q6H PRN Stephen Virgen

## 2023-11-12 LAB
ANION GAP SERPL CALC-SCNC: 6 MMOL/L (ref 2–11)
BASOPHILS # BLD: 0 K/UL (ref 0–0.2)
BASOPHILS NFR BLD: 0 % (ref 0–2)
BUN SERPL-MCNC: 5 MG/DL (ref 6–23)
CALCIUM SERPL-MCNC: 8.4 MG/DL (ref 8.3–10.4)
CHLORIDE SERPL-SCNC: 109 MMOL/L (ref 101–110)
CO2 SERPL-SCNC: 25 MMOL/L (ref 21–32)
CREAT SERPL-MCNC: 0.7 MG/DL (ref 0.6–1)
DIFFERENTIAL METHOD BLD: NORMAL
EOSINOPHIL # BLD: 0.4 K/UL (ref 0–0.8)
EOSINOPHIL NFR BLD: 5 % (ref 0.5–7.8)
ERYTHROCYTE [DISTWIDTH] IN BLOOD BY AUTOMATED COUNT: 12.9 % (ref 11.9–14.6)
GLUCOSE SERPL-MCNC: 90 MG/DL (ref 65–100)
HCT VFR BLD AUTO: 38 % (ref 35.8–46.3)
HGB BLD-MCNC: 12.6 G/DL (ref 11.7–15.4)
IMM GRANULOCYTES # BLD AUTO: 0 K/UL (ref 0–0.5)
IMM GRANULOCYTES NFR BLD AUTO: 0 % (ref 0–5)
LYMPHOCYTES # BLD: 2 K/UL (ref 0.5–4.6)
LYMPHOCYTES NFR BLD: 27 % (ref 13–44)
MCH RBC QN AUTO: 29.3 PG (ref 26.1–32.9)
MCHC RBC AUTO-ENTMCNC: 33.2 G/DL (ref 31.4–35)
MCV RBC AUTO: 88.4 FL (ref 82–102)
MONOCYTES # BLD: 0.5 K/UL (ref 0.1–1.3)
MONOCYTES NFR BLD: 7 % (ref 4–12)
NEUTS SEG # BLD: 4.5 K/UL (ref 1.7–8.2)
NEUTS SEG NFR BLD: 61 % (ref 43–78)
NRBC # BLD: 0 K/UL (ref 0–0.2)
PLATELET # BLD AUTO: 221 K/UL (ref 150–450)
PMV BLD AUTO: 12 FL (ref 9.4–12.3)
POTASSIUM SERPL-SCNC: 3.4 MMOL/L (ref 3.5–5.1)
RBC # BLD AUTO: 4.3 M/UL (ref 4.05–5.2)
SODIUM SERPL-SCNC: 140 MMOL/L (ref 133–143)
WBC # BLD AUTO: 7.4 K/UL (ref 4.3–11.1)

## 2023-11-12 PROCEDURE — 36415 COLL VENOUS BLD VENIPUNCTURE: CPT

## 2023-11-12 PROCEDURE — 85025 COMPLETE CBC W/AUTO DIFF WBC: CPT

## 2023-11-12 PROCEDURE — 2580000003 HC RX 258: Performed by: FAMILY MEDICINE

## 2023-11-12 PROCEDURE — 6370000000 HC RX 637 (ALT 250 FOR IP): Performed by: PHYSICIAN ASSISTANT

## 2023-11-12 PROCEDURE — 2580000003 HC RX 258: Performed by: INTERNAL MEDICINE

## 2023-11-12 PROCEDURE — 2580000003 HC RX 258: Performed by: PHYSICIAN ASSISTANT

## 2023-11-12 PROCEDURE — 80048 BASIC METABOLIC PNL TOTAL CA: CPT

## 2023-11-12 PROCEDURE — 6370000000 HC RX 637 (ALT 250 FOR IP): Performed by: INTERNAL MEDICINE

## 2023-11-12 PROCEDURE — 1100000000 HC RM PRIVATE

## 2023-11-12 PROCEDURE — 2500000003 HC RX 250 WO HCPCS: Performed by: INTERNAL MEDICINE

## 2023-11-12 PROCEDURE — 6360000002 HC RX W HCPCS: Performed by: INTERNAL MEDICINE

## 2023-11-12 PROCEDURE — 99232 SBSQ HOSP IP/OBS MODERATE 35: CPT | Performed by: PSYCHIATRY & NEUROLOGY

## 2023-11-12 RX ORDER — POTASSIUM CHLORIDE 20 MEQ/1
40 TABLET, EXTENDED RELEASE ORAL ONCE
Status: COMPLETED | OUTPATIENT
Start: 2023-11-12 | End: 2023-11-12

## 2023-11-12 RX ADMIN — HYDROMORPHONE HYDROCHLORIDE 2 MG: 1 INJECTION, SOLUTION INTRAMUSCULAR; INTRAVENOUS; SUBCUTANEOUS at 18:02

## 2023-11-12 RX ADMIN — OXYCODONE HYDROCHLORIDE 10 MG: 5 TABLET ORAL at 04:19

## 2023-11-12 RX ADMIN — OXYCODONE HYDROCHLORIDE 10 MG: 5 TABLET ORAL at 10:21

## 2023-11-12 RX ADMIN — PROMETHAZINE HYDROCHLORIDE 6.25 MG: 25 INJECTION INTRAMUSCULAR; INTRAVENOUS at 11:15

## 2023-11-12 RX ADMIN — SODIUM CHLORIDE: 9 INJECTION, SOLUTION INTRAVENOUS at 09:05

## 2023-11-12 RX ADMIN — HYDROMORPHONE HYDROCHLORIDE 2 MG: 1 INJECTION, SOLUTION INTRAMUSCULAR; INTRAVENOUS; SUBCUTANEOUS at 23:23

## 2023-11-12 RX ADMIN — SODIUM CHLORIDE, PRESERVATIVE FREE 10 ML: 5 INJECTION INTRAVENOUS at 20:55

## 2023-11-12 RX ADMIN — PROCHLORPERAZINE MALEATE 5 MG: 10 TABLET ORAL at 23:24

## 2023-11-12 RX ADMIN — HYDROMORPHONE HYDROCHLORIDE 2 MG: 1 INJECTION, SOLUTION INTRAMUSCULAR; INTRAVENOUS; SUBCUTANEOUS at 05:23

## 2023-11-12 RX ADMIN — METAXALONE 800 MG: 800 TABLET ORAL at 14:48

## 2023-11-12 RX ADMIN — PROMETHAZINE HYDROCHLORIDE 6.25 MG: 25 INJECTION INTRAMUSCULAR; INTRAVENOUS at 20:57

## 2023-11-12 RX ADMIN — METAXALONE 800 MG: 800 TABLET ORAL at 08:06

## 2023-11-12 RX ADMIN — OXYCODONE HYDROCHLORIDE 10 MG: 5 TABLET ORAL at 22:20

## 2023-11-12 RX ADMIN — POTASSIUM CHLORIDE 40 MEQ: 1500 TABLET, EXTENDED RELEASE ORAL at 12:02

## 2023-11-12 RX ADMIN — HYDROMORPHONE HYDROCHLORIDE 2 MG: 1 INJECTION, SOLUTION INTRAMUSCULAR; INTRAVENOUS; SUBCUTANEOUS at 12:02

## 2023-11-12 RX ADMIN — SODIUM CHLORIDE: 9 INJECTION, SOLUTION INTRAVENOUS at 19:25

## 2023-11-12 RX ADMIN — OXYCODONE HYDROCHLORIDE 10 MG: 5 TABLET ORAL at 16:26

## 2023-11-12 RX ADMIN — METAXALONE 800 MG: 800 TABLET ORAL at 20:55

## 2023-11-12 RX ADMIN — PROMETHAZINE HYDROCHLORIDE 6.25 MG: 25 INJECTION INTRAMUSCULAR; INTRAVENOUS at 00:55

## 2023-11-12 ASSESSMENT — PAIN DESCRIPTION - LOCATION
LOCATION: NECK
LOCATION: HEAD
LOCATION: NECK

## 2023-11-12 ASSESSMENT — PAIN DESCRIPTION - ONSET
ONSET: ON-GOING
ONSET: ON-GOING
ONSET: GRADUAL
ONSET: ON-GOING
ONSET: AWAKENED FROM SLEEP
ONSET: AWAKENED FROM SLEEP
ONSET: ON-GOING
ONSET: ON-GOING

## 2023-11-12 ASSESSMENT — PAIN DESCRIPTION - FREQUENCY
FREQUENCY: CONTINUOUS

## 2023-11-12 ASSESSMENT — PAIN SCALES - GENERAL
PAINLEVEL_OUTOF10: 9
PAINLEVEL_OUTOF10: 10
PAINLEVEL_OUTOF10: 10
PAINLEVEL_OUTOF10: 7
PAINLEVEL_OUTOF10: 10
PAINLEVEL_OUTOF10: 5
PAINLEVEL_OUTOF10: 4
PAINLEVEL_OUTOF10: 10
PAINLEVEL_OUTOF10: 7
PAINLEVEL_OUTOF10: 10
PAINLEVEL_OUTOF10: 7
PAINLEVEL_OUTOF10: 3
PAINLEVEL_OUTOF10: 7
PAINLEVEL_OUTOF10: 5
PAINLEVEL_OUTOF10: 5
PAINLEVEL_OUTOF10: 6
PAINLEVEL_OUTOF10: 4

## 2023-11-12 ASSESSMENT — PAIN DESCRIPTION - PAIN TYPE
TYPE: ACUTE PAIN

## 2023-11-12 ASSESSMENT — PAIN DESCRIPTION - ORIENTATION
ORIENTATION: POSTERIOR

## 2023-11-12 ASSESSMENT — PAIN DESCRIPTION - DESCRIPTORS
DESCRIPTORS: SQUEEZING
DESCRIPTORS: ACHING;THROBBING
DESCRIPTORS: ACHING;THROBBING
DESCRIPTORS: ACHING;SQUEEZING
DESCRIPTORS: ACHING;THROBBING
DESCRIPTORS: ACHING;THROBBING;CRUSHING
DESCRIPTORS: SQUEEZING
DESCRIPTORS: ACHING;SQUEEZING

## 2023-11-12 ASSESSMENT — PAIN - FUNCTIONAL ASSESSMENT
PAIN_FUNCTIONAL_ASSESSMENT: PREVENTS OR INTERFERES SOME ACTIVE ACTIVITIES AND ADLS

## 2023-11-12 NOTE — PLAN OF CARE
Problem: Pain  Goal: Verbalizes/displays adequate comfort level or baseline comfort level  11/12/2023 0923 by Cheryl Buenrostro RN  Outcome: Progressing  11/11/2023 2146 by Jaylene Rashid RN  Outcome: Progressing  Flowsheets (Taken 11/11/2023 2047)  Verbalizes/displays adequate comfort level or baseline comfort level:   Encourage patient to monitor pain and request assistance   Assess pain using appropriate pain scale   Administer analgesics based on type and severity of pain and evaluate response   Implement non-pharmacological measures as appropriate and evaluate response     Problem: Skin/Tissue Integrity  Goal: Absence of new skin breakdown  Description: 1. Monitor for areas of redness and/or skin breakdown  2. Assess vascular access sites hourly  3. Every 4-6 hours minimum:  Change oxygen saturation probe site  4. Every 4-6 hours:  If on nasal continuous positive airway pressure, respiratory therapy assess nares and determine need for appliance change or resting period.   11/12/2023 0923 by Cheryl Buenrostro RN  Outcome: Progressing  11/11/2023 2146 by Jaylene Rashid RN  Outcome: Progressing     Problem: Safety - Adult  Goal: Free from fall injury  11/12/2023 0923 by Cheryl Buenrostro RN  Outcome: Progressing  11/11/2023 2146 by Jaylene Rashid RN  Outcome: Progressing  Flowsheets (Taken 11/11/2023 2000)  Free From Fall Injury: Instruct family/caregiver on patient safety     Problem: Musculoskeletal - Adult  Goal: Return mobility to safest level of function  11/12/2023 0923 by Cheryl Buenrostro RN  Outcome: Progressing  Flowsheets (Taken 11/12/2023 0755)  Return Mobility to Safest Level of Function: Assess patient stability and activity tolerance for standing, transferring and ambulating with or without assistive devices  11/11/2023 2146 by Jaylene Rashid RN  Outcome: Progressing  Flowsheets (Taken 11/11/2023 2000)  Return Mobility to Safest Level of Function: Assess patient stability

## 2023-11-12 NOTE — PROGRESS NOTES
Neurology Progress Note       Interval History: Again, she appears more comfortable today. Continues to have some neck pain. Examination: Pertinent positives and negatives include:    Neurological examination continues to show 4+ reflexes in the bilateral lower extremities with sustained clonus with foot dorsiflexion. Strength is completely normal.  No sensory loss. Assessment and Plan: 70-year-old woman with a CSF leak after lumbar puncture for idiopathic intracranial hypertension. She has compression on her thoracic spine from CSF accumulation in the thecal sac which is causing some myelopathic changes on her examination (improving). If the patient develops weakness then I recommend a lumbar drain. Otherwise, we will see how she does through the weekend. The plan is to consider a shunt tomorrow. Cumulative time spent today was 35 minutes which included chart review, examining the patient, obtaining history from patient/family/other providers, reviewing imaging, and counseling the patient and/or family on medical condition.

## 2023-11-12 NOTE — PROGRESS NOTES
EOS notes:    Continues to c/o neck pain. Had x2 Oxy in addition to scheduled doses of IV Dilaudid. C/O nausea;x1 po Compazine,x1 IV Phenergan given;verbalized relief. States slept better tonight.

## 2023-11-12 NOTE — PROGRESS NOTES
Hospitalist Progress Note   Admit Date:  2023  6:14 PM   Name:  Daniele Walden   Age:  29 y.o. Sex:  female  :  1995   MRN:  [id number]   Room:  [facility]/    Presenting/Chief Complaint: Back Pain and Post-op Problem     Reason(s) for Admission: Intractable back pain [M54.9]  Acute midline back pain, unspecified back location [M54.9]     Hospital Course:   Daniele Walden is a 29 y.o. female with medical history of  pseudotumor cerebri who was admitted to our service on  with intractable neck and back pain following large volume LP on . There is concern for possible pneumocephalus given her symptoms. Neurology is on board and patient is being treated aggressively for her symptoms. MRI completed on  shows findings that are concerning for possible infection superimposed upon a CSF leak related to the LP. ID, neurosurgery were consulted. Based on patient's clinical presentation, low clinical suspicion of infection at this time. Repeat LP not required. Subjective & 24hr Events:   Patient was seen evaluated at bedside this morning.  was at bedside. Patient states it is difficult to sleep because of the neck pain but otherwise not much is changed. She reports symptoms worsen when she stands up. Nausea is somewhat improved with antiemetic therapy. No other issues noted. Assessment & Plan:     Pneumocephalus status post LP  Intractable back/cervical pain  CSF leak  Patient symptoms are minimally improved. Due to the CSF leak, she is having some myelopathic changes which are improving on exam.  Currently considering arrangement for definitive therapy with neurosurgery.   Patient agreeable to plan, all questions answered.  -Continue with bedrest, head of bed elevated 30 degrees  -Avoid Valsalva maneuvers  -Continue with high flow oxygen  -Pain control as needed - PRN dilaudid, oxycodone  -Continue Flexeril 10 mg twice daily  - No antibiotics required

## 2023-11-12 NOTE — PROGRESS NOTES
3684: Received pt from [place] in stable condition. Pt in bed resting quietly. Resp even & unlabored on room air; no acute signs of distress noted. Bed low & locked; call light in reach; no needs voiced. 1115: Phenergan 6.25 mg IVPB given for c/o nausea. 1141: Pt states the Phenergan did help with her nausea.

## 2023-11-13 PROCEDURE — 99233 SBSQ HOSP IP/OBS HIGH 50: CPT | Performed by: NEUROLOGICAL SURGERY

## 2023-11-13 PROCEDURE — 99232 SBSQ HOSP IP/OBS MODERATE 35: CPT | Performed by: PHYSICAL THERAPIST

## 2023-11-13 PROCEDURE — 2580000003 HC RX 258: Performed by: INTERNAL MEDICINE

## 2023-11-13 PROCEDURE — 6370000000 HC RX 637 (ALT 250 FOR IP): Performed by: PHYSICIAN ASSISTANT

## 2023-11-13 PROCEDURE — 6370000000 HC RX 637 (ALT 250 FOR IP): Performed by: FAMILY MEDICINE

## 2023-11-13 PROCEDURE — 6360000002 HC RX W HCPCS: Performed by: INTERNAL MEDICINE

## 2023-11-13 PROCEDURE — 2500000003 HC RX 250 WO HCPCS: Performed by: INTERNAL MEDICINE

## 2023-11-13 PROCEDURE — 1100000000 HC RM PRIVATE

## 2023-11-13 PROCEDURE — 2580000003 HC RX 258: Performed by: FAMILY MEDICINE

## 2023-11-13 PROCEDURE — 6370000000 HC RX 637 (ALT 250 FOR IP): Performed by: INTERNAL MEDICINE

## 2023-11-13 PROCEDURE — 2580000003 HC RX 258: Performed by: PHYSICIAN ASSISTANT

## 2023-11-13 RX ORDER — HYDROMORPHONE HYDROCHLORIDE 1 MG/ML
1 INJECTION, SOLUTION INTRAMUSCULAR; INTRAVENOUS; SUBCUTANEOUS EVERY 4 HOURS PRN
Status: DISCONTINUED | OUTPATIENT
Start: 2023-11-13 | End: 2023-11-14 | Stop reason: HOSPADM

## 2023-11-13 RX ORDER — PROMETHAZINE HYDROCHLORIDE 25 MG/1
25 TABLET ORAL EVERY 6 HOURS PRN
Status: DISCONTINUED | OUTPATIENT
Start: 2023-11-13 | End: 2023-11-14 | Stop reason: HOSPADM

## 2023-11-13 RX ORDER — HYDROMORPHONE HYDROCHLORIDE 2 MG/1
2 TABLET ORAL EVERY 6 HOURS PRN
Status: DISCONTINUED | OUTPATIENT
Start: 2023-11-13 | End: 2023-11-14 | Stop reason: HOSPADM

## 2023-11-13 RX ADMIN — HYDROMORPHONE HYDROCHLORIDE 2 MG: 1 INJECTION, SOLUTION INTRAMUSCULAR; INTRAVENOUS; SUBCUTANEOUS at 12:08

## 2023-11-13 RX ADMIN — PROCHLORPERAZINE MALEATE 5 MG: 10 TABLET ORAL at 14:25

## 2023-11-13 RX ADMIN — METAXALONE 800 MG: 800 TABLET ORAL at 08:45

## 2023-11-13 RX ADMIN — HYDROMORPHONE HYDROCHLORIDE 2 MG: 2 TABLET ORAL at 20:14

## 2023-11-13 RX ADMIN — PROMETHAZINE HYDROCHLORIDE 25 MG: 25 TABLET ORAL at 19:18

## 2023-11-13 RX ADMIN — METAXALONE 800 MG: 800 TABLET ORAL at 14:18

## 2023-11-13 RX ADMIN — HYDROMORPHONE HYDROCHLORIDE 2 MG: 1 INJECTION, SOLUTION INTRAMUSCULAR; INTRAVENOUS; SUBCUTANEOUS at 05:55

## 2023-11-13 RX ADMIN — OXYCODONE HYDROCHLORIDE 10 MG: 5 TABLET ORAL at 17:09

## 2023-11-13 RX ADMIN — OXYCODONE HYDROCHLORIDE 10 MG: 5 TABLET ORAL at 09:58

## 2023-11-13 RX ADMIN — METAXALONE 800 MG: 800 TABLET ORAL at 20:15

## 2023-11-13 RX ADMIN — PROMETHAZINE HYDROCHLORIDE 6.25 MG: 25 INJECTION INTRAMUSCULAR; INTRAVENOUS at 10:02

## 2023-11-13 RX ADMIN — ACETAMINOPHEN 650 MG: 325 TABLET ORAL at 03:27

## 2023-11-13 RX ADMIN — HYDROMORPHONE HYDROCHLORIDE 2 MG: 2 TABLET ORAL at 14:25

## 2023-11-13 RX ADMIN — SODIUM CHLORIDE, PRESERVATIVE FREE 10 ML: 5 INJECTION INTRAVENOUS at 20:16

## 2023-11-13 RX ADMIN — OXYCODONE HYDROCHLORIDE 10 MG: 5 TABLET ORAL at 04:37

## 2023-11-13 RX ADMIN — SODIUM CHLORIDE: 9 INJECTION, SOLUTION INTRAVENOUS at 06:06

## 2023-11-13 ASSESSMENT — PAIN DESCRIPTION - LOCATION
LOCATION: NECK
LOCATION: HEAD;NECK;EYE
LOCATION: HEAD;EYE
LOCATION: NECK
LOCATION: NECK

## 2023-11-13 ASSESSMENT — PAIN DESCRIPTION - FREQUENCY
FREQUENCY: CONTINUOUS

## 2023-11-13 ASSESSMENT — PAIN DESCRIPTION - ORIENTATION
ORIENTATION: POSTERIOR
ORIENTATION: ANTERIOR

## 2023-11-13 ASSESSMENT — PAIN DESCRIPTION - PAIN TYPE
TYPE: ACUTE PAIN

## 2023-11-13 ASSESSMENT — PAIN SCALES - GENERAL
PAINLEVEL_OUTOF10: 5
PAINLEVEL_OUTOF10: 7
PAINLEVEL_OUTOF10: 10
PAINLEVEL_OUTOF10: 7
PAINLEVEL_OUTOF10: 10
PAINLEVEL_OUTOF10: 9
PAINLEVEL_OUTOF10: 8
PAINLEVEL_OUTOF10: 6
PAINLEVEL_OUTOF10: 4
PAINLEVEL_OUTOF10: 3
PAINLEVEL_OUTOF10: 4
PAINLEVEL_OUTOF10: 7
PAINLEVEL_OUTOF10: 10
PAINLEVEL_OUTOF10: 6
PAINLEVEL_OUTOF10: 10
PAINLEVEL_OUTOF10: 5

## 2023-11-13 ASSESSMENT — PAIN DESCRIPTION - DESCRIPTORS
DESCRIPTORS: SQUEEZING;TIGHTNESS
DESCRIPTORS: SQUEEZING;THROBBING;TIGHTNESS
DESCRIPTORS: SQUEEZING;TIGHTNESS
DESCRIPTORS: TIGHTNESS;SQUEEZING
DESCRIPTORS: SQUEEZING;TIGHTNESS
DESCRIPTORS: SHARP
DESCRIPTORS: TIGHTNESS;SQUEEZING

## 2023-11-13 ASSESSMENT — PAIN DESCRIPTION - ONSET
ONSET: PROGRESSIVE
ONSET: PROGRESSIVE
ONSET: ON-GOING
ONSET: PROGRESSIVE
ONSET: PROGRESSIVE
ONSET: ON-GOING
ONSET: PROGRESSIVE

## 2023-11-13 ASSESSMENT — PAIN - FUNCTIONAL ASSESSMENT
PAIN_FUNCTIONAL_ASSESSMENT: PREVENTS OR INTERFERES SOME ACTIVE ACTIVITIES AND ADLS
PAIN_FUNCTIONAL_ASSESSMENT: PREVENTS OR INTERFERES SOME ACTIVE ACTIVITIES AND ADLS
PAIN_FUNCTIONAL_ASSESSMENT: PREVENTS OR INTERFERES WITH MANY ACTIVE NOT PASSIVE ACTIVITIES
PAIN_FUNCTIONAL_ASSESSMENT: PREVENTS OR INTERFERES SOME ACTIVE ACTIVITIES AND ADLS
PAIN_FUNCTIONAL_ASSESSMENT: PREVENTS OR INTERFERES WITH MANY ACTIVE NOT PASSIVE ACTIVITIES

## 2023-11-13 NOTE — PROGRESS NOTES
Case discussed with neurosurgery at length. At this time, we will opt to attempt to control her pain with p.o. medications and see if patient can get back to a baseline functioning that will allow her to go home and follow-up as outpatient. Oral Dilaudid 2 mg to be started every 6 hours as needed for pain. Her IV dosing has been reduced to 1 mg every 4 hours as needed but patient has been encouraged to try to continue with oral medications only. Patient also encouraged to get up and walk around. If pain unable to be controlled, will likely need some sort of neurosurgical intervention which can be addressed and discussed if needed at that time while she is still here as per discussion. We will continue to monitor the situation closely.

## 2023-11-13 NOTE — CARE COORDINATION
Pt discussed during IDR. Awaiting neurosurgery consult and recommendation. Possible shunt vs other surgical intervention. Per progress notes no IR intervention planned. Neurology has signed off. No plan for repeat LP. There have not been any PT/OT consults ordered. Dispo: pending neurosurgery recommendations. D/C timeframe undetermined at the present time. CM will continue to follow.   LOS = 7 days

## 2023-11-13 NOTE — PROGRESS NOTES
2658: Received pt from [place] in stable condition. Pt in bed resting quietly. Resp even & unlabored on room air; no acute signs of distress noted. Bed low & locked; call light in reach; no needs voiced.

## 2023-11-13 NOTE — PLAN OF CARE
Problem: Pain  Goal: Verbalizes/displays adequate comfort level or baseline comfort level  Outcome: Progressing  Flowsheets (Taken 11/12/2023 2220 by Sharri Zabala RN)  Verbalizes/displays adequate comfort level or baseline comfort level:   Encourage patient to monitor pain and request assistance   Assess pain using appropriate pain scale   Administer analgesics based on type and severity of pain and evaluate response   Implement non-pharmacological measures as appropriate and evaluate response     Problem: Skin/Tissue Integrity  Goal: Absence of new skin breakdown  Description: 1. Monitor for areas of redness and/or skin breakdown  2. Assess vascular access sites hourly  3. Every 4-6 hours minimum:  Change oxygen saturation probe site  4. Every 4-6 hours:  If on nasal continuous positive airway pressure, respiratory therapy assess nares and determine need for appliance change or resting period.   Outcome: Progressing     Problem: Safety - Adult  Goal: Free from fall injury  Outcome: Progressing     Problem: Musculoskeletal - Adult  Goal: Return mobility to safest level of function  Outcome: Progressing  Flowsheets (Taken 11/13/2023 0822)  Return Mobility to Safest Level of Function: Assess patient stability and activity tolerance for standing, transferring and ambulating with or without assistive devices  Goal: Maintain proper alignment of affected body part  Outcome: Progressing  Goal: Return ADL status to a safe level of function  Outcome: Progressing  Flowsheets (Taken 11/13/2023 9688)  Return ADL Status to a Safe Level of Function: Assess activities of daily living deficits and provide assistive devices as needed No

## 2023-11-13 NOTE — PROGRESS NOTES
Ms. Jim Pickard has been in since 11/6/2023 after developing severe neck and back pain after a lumbar puncture for pseudotumor cerebri. Dr. Nieves Bourne saw her in consult on 11/11/2023. Her MRI was reviewed and it was felt that she needed no acute intervention. She states that she has had her headaches for many years she was diagnosed with pseudotumor and 2018. She has been off and on Diamox over the years with varying degrees of success. She has had a series of lumbar punctures which have provided some degree of relief but never for long-term. She states that she really does not have headaches per se she has visual symptoms and she has severe eye pain. She states that she is having the low pressure CSF leak headaches whenever she is up with severe neck pain. But now when she is nothing at this starting to have some of her traditional pseudotumor type eye symptoms and eye pain returning. I reviewed her MRI and I agreed that she does not have any acute neurosurgical or any interventional needs as far as the epidural CSF collection. She is neurologically intact and really having no symptoms distal to the CSF collection. We had a very long talk about her pseudotumor and the various treatment options. I agree that it is obvious that Diamox and serial LPs were not a long-term solution for her. She probably will end up having to be shunted. We discussed the pros and cons of ventricular shunting versus LP shunting. Since she is having such exquisite neck pain with the pressure balance across her foramen magnum with the CSF leak I am concerned she would have similar issues with LP shunting. Her ventricles are small but there large enough that I think ventricular shunting if we approach that would be her best option. We also spent some degree of time discussing optic nerve fenestration. Is a one size fits all treatment for pseudotumor cerebri that has largely fallen out of favor.   In her case it is most of

## 2023-11-13 NOTE — PROGRESS NOTES
Hospitalist Progress Note   Admit Date:  2023  6:14 PM   Name:  Zack Cazares   Age:  29 y.o. Sex:  female  :  1995   MRN:  [id number]   Room:  [facility]/    Presenting/Chief Complaint: Back Pain and Post-op Problem     Reason(s) for Admission: Intractable back pain [M54.9]  Acute midline back pain, unspecified back location [M54.9]     Hospital Course:   Zack Cazares is a 29 y.o. female with medical history of  pseudotumor cerebri who was admitted to our service on  with intractable neck and back pain following large volume LP on . There is concern for possible pneumocephalus given her symptoms. Neurology is on board and patient is being treated aggressively for her symptoms. MRI completed on  shows findings that are concerning for possible infection superimposed upon a CSF leak related to the LP. ID, neurosurgery were consulted. Based on patient's clinical presentation, low clinical suspicion of infection at this time. Repeat LP not required. Subjective & 24hr Events:   Patient was seen evaluated at bedside this morning. Mother was at bedside. Patient reports no significant changes in her neck pain. She reports that pain is worse when she stands up. Denies any fevers chills, shortness of breath, chest pain, numbness in extremities, blurry vision, headaches. Assessment & Plan:     Pneumocephalus status post LP  Intractable back/cervical pain  CSF leak  Symptoms appear to be same as they were yesterday. No signs and symptoms of myelopathy today. Currently pending neurosurgery evaluation for possible shunt placement or other surgical procedure to help with symptoms. Neurology recs seen and noted.     -Continue with bedrest, head of bed elevated 30 degrees  -Avoid Valsalva maneuvers  -Continue with high flow oxygen  -Pain control as needed - PRN dilaudid, oxycodone  - No antibiotics required at this time  - Neurosurgery evaluation venous distension.  Lungs:   CTAB.  No wheezing, rhonchi, or rales.  Symmetric expansion.  Abdomen:   Soft, nontender, nondistended.  Extremities: No cyanosis or clubbing.  No edema  Skin:     No rashes and normal coloration.   Warm and dry.    Neuro:  CN II-XII grossly intact.    Psych:  Normal mood and affect.      I have personally reviewed labs and tests:  Recent Labs:  Recent Results (from the past 48 hour(s))   CBC with Auto Differential    Collection Time: 11/12/23  6:30 AM   Result Value Ref Range    WBC 7.4 4.3 - 11.1 K/uL    RBC 4.30 4.05 - 5.2 M/uL    Hemoglobin 12.6 11.7 - 15.4 g/dL    Hematocrit 38.0 35.8 - 46.3 %    MCV 88.4 82 - 102 FL    MCH 29.3 26.1 - 32.9 PG    MCHC 33.2 31.4 - 35.0 g/dL    RDW 12.9 11.9 - 14.6 %    Platelets 221 150 - 450 K/uL    MPV 12.0 9.4 - 12.3 FL    nRBC 0.00 0.0 - 0.2 K/uL    Differential Type AUTOMATED      Neutrophils % 61 43 - 78 %    Lymphocytes % 27 13 - 44 %    Monocytes % 7 4.0 - 12.0 %    Eosinophils % 5 0.5 - 7.8 %    Basophils % 0 0.0 - 2.0 %    Immature Granulocytes 0 0.0 - 5.0 %    Neutrophils Absolute 4.5 1.7 - 8.2 K/UL    Lymphocytes Absolute 2.0 0.5 - 4.6 K/UL    Monocytes Absolute 0.5 0.1 - 1.3 K/UL    Eosinophils Absolute 0.4 0.0 - 0.8 K/UL    Basophils Absolute 0.0 0.0 - 0.2 K/UL    Absolute Immature Granulocyte 0.0 0.0 - 0.5 K/UL   Basic Metabolic Panel    Collection Time: 11/12/23  6:30 AM   Result Value Ref Range    Sodium 140 133 - 143 mmol/L    Potassium 3.4 (L) 3.5 - 5.1 mmol/L    Chloride 109 101 - 110 mmol/L    CO2 25 21 - 32 mmol/L    Anion Gap 6 2 - 11 mmol/L    Glucose 90 65 - 100 mg/dL    BUN 5 (L) 6 - 23 MG/DL    Creatinine 0.70 0.6 - 1.0 MG/DL    Est, Glom Filt Rate >60 >60 ml/min/1.73m2    Calcium 8.4 8.3 - 10.4 MG/DL       Current Meds:  Current Facility-Administered Medications   Medication Dose Route Frequency    lactulose (CHRONULAC) 10 GM/15ML solution 20 g  20 g Oral BID PRN    promethazine (PHENERGAN) 6.25 mg in sodium chloride 0.9 %

## 2023-11-13 NOTE — PROGRESS NOTES
EOS notes:    No acute neuro changes. Neck pain seem worse tonight per pt. Medicated x 2 w/ Oxy,x1 po Tylenol in addition to scheduled IV Dilaudid;muscle relaxants. Nausea continues;relieved w/ prn anti emetics.

## 2023-11-13 NOTE — PROGRESS NOTES
Neurology Progress Note       Interval History: Supine in bed on presentation with mother at bedside. Reports her normal IIH headaches are \"coming and going. \" No visual changes. Overall pain has mildly improved. Neck pain continues to be worse with sitting up or standing. Examination: Pertinent positives and negatives include:    Neurological examination continues to show 4+ reflexes in the bilateral lower extremities with sustained clonus with foot dorsiflexion. BUE and BLE strength is 5/5 grossly. Sensation is normal.       Assessment and Plan: 19-year-old woman with a CSF leak after lumbar puncture for idiopathic intracranial hypertension. She has compression on her thoracic spine from CSF accumulation in the thecal sac which is causing some myelopathic changes on her examination. The changes have improved since initial presentation but have reached plateau over the last 24 hours. She will be evaluated by neurosurgery today in consideration for shunt vs other surgical intervention. No further recommendation at this time. Neurology will sign off. Thank you for letting us participate in this patient's care. Cumulative time spent today was 35 minutes which included chart review, examining the patient, obtaining history from patient/family/other providers, reviewing imaging, and counseling the patient and/or family on medical condition.        Libby Stephenson [place]  Neurology

## 2023-11-14 VITALS
HEIGHT: 64 IN | WEIGHT: 222.2 LBS | RESPIRATION RATE: 19 BRPM | SYSTOLIC BLOOD PRESSURE: 106 MMHG | HEART RATE: 65 BPM | DIASTOLIC BLOOD PRESSURE: 65 MMHG | TEMPERATURE: 98.2 F | OXYGEN SATURATION: 99 % | BODY MASS INDEX: 37.94 KG/M2

## 2023-11-14 PROBLEM — G93.89 PNEUMOCEPHALUS: Status: ACTIVE | Noted: 2023-11-14

## 2023-11-14 PROBLEM — G97.0: Status: ACTIVE | Noted: 2023-11-14

## 2023-11-14 PROCEDURE — 2580000003 HC RX 258: Performed by: FAMILY MEDICINE

## 2023-11-14 PROCEDURE — 6370000000 HC RX 637 (ALT 250 FOR IP): Performed by: INTERNAL MEDICINE

## 2023-11-14 PROCEDURE — 6370000000 HC RX 637 (ALT 250 FOR IP): Performed by: PHYSICIAN ASSISTANT

## 2023-11-14 PROCEDURE — 6370000000 HC RX 637 (ALT 250 FOR IP): Performed by: FAMILY MEDICINE

## 2023-11-14 RX ORDER — PROMETHAZINE HYDROCHLORIDE 25 MG/1
25 TABLET ORAL EVERY 6 HOURS PRN
Qty: 56 TABLET | Refills: 0 | Status: SHIPPED | OUTPATIENT
Start: 2023-11-14 | End: 2023-11-28

## 2023-11-14 RX ORDER — HYDROMORPHONE HYDROCHLORIDE 2 MG/1
2 TABLET ORAL EVERY 6 HOURS PRN
Qty: 56 TABLET | Refills: 0 | Status: SHIPPED | OUTPATIENT
Start: 2023-11-14 | End: 2023-11-28

## 2023-11-14 RX ORDER — METAXALONE 800 MG/1
800 TABLET ORAL 3 TIMES DAILY
Qty: 42 TABLET | Refills: 0 | Status: SHIPPED | OUTPATIENT
Start: 2023-11-14 | End: 2023-11-28

## 2023-11-14 RX ADMIN — SODIUM CHLORIDE, PRESERVATIVE FREE 10 ML: 5 INJECTION INTRAVENOUS at 08:58

## 2023-11-14 RX ADMIN — POTASSIUM CHLORIDE 40 MEQ: 1500 TABLET, EXTENDED RELEASE ORAL at 08:57

## 2023-11-14 RX ADMIN — OXYCODONE HYDROCHLORIDE 10 MG: 5 TABLET ORAL at 01:46

## 2023-11-14 RX ADMIN — METAXALONE 800 MG: 800 TABLET ORAL at 08:56

## 2023-11-14 RX ADMIN — PROMETHAZINE HYDROCHLORIDE 25 MG: 25 TABLET ORAL at 08:56

## 2023-11-14 RX ADMIN — METAXALONE 800 MG: 800 TABLET ORAL at 14:02

## 2023-11-14 RX ADMIN — OXYCODONE HYDROCHLORIDE 10 MG: 5 TABLET ORAL at 12:03

## 2023-11-14 RX ADMIN — HYDROMORPHONE HYDROCHLORIDE 2 MG: 2 TABLET ORAL at 08:56

## 2023-11-14 RX ADMIN — PROCHLORPERAZINE MALEATE 5 MG: 10 TABLET ORAL at 01:46

## 2023-11-14 RX ADMIN — PROCHLORPERAZINE MALEATE 5 MG: 10 TABLET ORAL at 12:03

## 2023-11-14 ASSESSMENT — PAIN DESCRIPTION - DESCRIPTORS
DESCRIPTORS: ACHING;DISCOMFORT
DESCRIPTORS: NAGGING

## 2023-11-14 ASSESSMENT — PAIN DESCRIPTION - PAIN TYPE: TYPE: ACUTE PAIN

## 2023-11-14 ASSESSMENT — PAIN SCALES - GENERAL
PAINLEVEL_OUTOF10: 10
PAINLEVEL_OUTOF10: 8

## 2023-11-14 ASSESSMENT — PAIN DESCRIPTION - ONSET: ONSET: ON-GOING

## 2023-11-14 ASSESSMENT — PAIN DESCRIPTION - LOCATION
LOCATION: HEAD;NECK;FACE
LOCATION: HEAD

## 2023-11-14 ASSESSMENT — PAIN DESCRIPTION - FREQUENCY: FREQUENCY: CONTINUOUS

## 2023-11-14 ASSESSMENT — PAIN - FUNCTIONAL ASSESSMENT: PAIN_FUNCTIONAL_ASSESSMENT: ACTIVITIES ARE NOT PREVENTED

## 2023-11-14 ASSESSMENT — PAIN DESCRIPTION - ORIENTATION: ORIENTATION: RIGHT;MID;LOWER

## 2023-11-14 NOTE — CARE COORDINATION
Pt to d/c home today. Family will provide transportation. There were no PT/OT consults ordered during this hospitalization. Pt is independent with ADLs at baseline. Pt to f/u OP with PCP and neurosurgery. Pt's pain is at a level to where she feels she can now function at home. No supportive care needs identified. Pt agrees with d/c plan. Milestones met. LOS = 8 days       11/07/23 1534   Service Assessment   Patient Orientation Alert and Oriented;Person;Place;Situation;Self   Cognition Alert   History Provided By Patient;Medical Record   Primary Caregiver Self   Accompanied By/Relationship Pio Davila Enriquez/spouse   Support Systems Spouse/Significant Other;Parent; Family Members   Patient's Healthcare Decision Maker is: Legal Next of [address] [place] [address]   PCP Verified by CM Yes  (Rachel Bullard. Aditya Barr MD)   Last Visit to PCP Within last two years   Prior Functional Level Independent in ADLs/IADLs   Current Functional Level Assistance with the following:;Toileting   Can patient return to prior living arrangement Yes   Ability to make needs known: Good   Family able to assist with home care needs: Yes   Would you like for me to discuss the discharge plan with any other family members/significant others, and if so, who?  Yes  (Miryam Marcum)   Financial Resources Other (Comment)  (pt and spouse report [employer] beginning November 1st.)   [employer] None   CM/SW Referral Other (see comment)  (N/A)   Social/Functional History   Lives With Spouse;Son;Daughter   Type of [address] [place] [address]  One level   Bathroom Toilet Standard   Bathroom Equipment Commode   Bathroom Accessibility Accessible   Home Equipment None   Receives Help From Family   ADL Assistance Independent   Homemaking Assistance Independent   Homemaking Responsibilities Yes   Ambulation Assistance Independent   Transfer Assistance Independent   Active  Yes   Mode of Transportation Car   Occupation Works at home   Type of

## 2023-11-14 NOTE — DISCHARGE SUMMARY
prescription for 2 weeks worth of pain medications.  Patient will need to follow-up with neurosurgery and neuro-ophthalmology as instructed by the neurosurgeon.       Disposition: Home  Diet: ADULT DIET; Regular  Code Status: Full Code    Follow Ups:    Please follow up with Dr. Choudhary within 1 week of discharge for neurosurgery followup.      Time spent in patient discharge and coordination 35 minutes.        Follow up labs/diagnostics (ultimately defer to outpatient provider):  None    Plan was discussed with patient and .  All questions answered.  Patient was stable at time of discharge.  Instructions given to call a physician or return if any concerns.    Current Discharge Medication List        START taking these medications    Details   HYDROmorphone (DILAUDID) 2 MG tablet Take 1 tablet by mouth every 6 hours as needed for Pain for up to 14 days. Max Daily Amount: 8 mg  Qty: 56 tablet, Refills: 0    Comments: Reduce doses taken as pain becomes manageable  Associated Diagnoses: Pneumocephalus; Intractable back pain      metaxalone (SKELAXIN) 800 MG tablet Take 1 tablet by mouth 3 times daily for 14 days  Qty: 42 tablet, Refills: 0           CONTINUE these medications which have CHANGED    Details   promethazine (PHENERGAN) 25 MG tablet Take 1 tablet by mouth every 6 hours as needed for Nausea  Qty: 56 tablet, Refills: 0           STOP taking these medications       butalbital-acetaminophen-caffeine (FIORICET, ESGIC) -40 MG per tablet Comments:   Reason for Stopping:         cephALEXin (KEFLEX) 500 MG capsule Comments:   Reason for Stopping:         famotidine (PEPCID) 20 MG tablet Comments:   Reason for Stopping:         predniSONE (DELTASONE) 20 MG tablet Comments:   Reason for Stopping:         acetaZOLAMIDE (DIAMOX) 250 MG tablet Comments:   Reason for Stopping:         methazolAMIDE (NEPTAZANE) 25 MG tablet Comments:   Reason for Stopping:         acetaZOLAMIDE (DIAMOX) 250 MG tablet Comments:  Neuro:             CN II-XII grossly intact. Psych:             Normal mood and affect. Signed:  Eboni Narvaez MD    Part of this note may have been written by using a voice dictation software. The note has been proof read but may still contain some grammatical/other typographical errors.

## 2023-11-14 NOTE — PROGRESS NOTES
EOS 7p-7a    BSSR received from off going RN Umu Castro    2014 dilaudid PO given c/o pain  0146 oxycodone PO given c/o pain  0146 compazine PO given c/o nausea    Rounded on hourly by myself and patient assistant. Bed locked, low, and call light within reach.  remained at bedside throughout the night. No new needs voiced at this time.      BSSR given to oncoming [employer]

## 2023-11-21 ENCOUNTER — OFFICE VISIT (OUTPATIENT)
Dept: NEUROSURGERY | Age: 28
End: 2023-11-21

## 2023-11-21 VITALS
SYSTOLIC BLOOD PRESSURE: 109 MMHG | DIASTOLIC BLOOD PRESSURE: 76 MMHG | HEIGHT: 64 IN | HEART RATE: 75 BPM | BODY MASS INDEX: 38.24 KG/M2 | WEIGHT: 224 LBS | TEMPERATURE: 97.2 F | OXYGEN SATURATION: 97 %

## 2023-11-21 DIAGNOSIS — G97.0 CEREBROSPINAL FLUID LEAK AT LUMBAR PUNCTURE SITE: ICD-10-CM

## 2023-11-21 DIAGNOSIS — G93.2 PSEUDOTUMOR CEREBRI: Primary | ICD-10-CM

## 2023-11-21 DIAGNOSIS — M54.9 INTRACTABLE BACK PAIN: ICD-10-CM

## 2023-11-21 PROBLEM — G93.89 PNEUMOCEPHALUS: Status: RESOLVED | Noted: 2023-11-14 | Resolved: 2023-11-21

## 2023-11-21 PROCEDURE — 99214 OFFICE O/P EST MOD 30 MIN: CPT | Performed by: NEUROLOGICAL SURGERY

## 2023-11-21 ASSESSMENT — ENCOUNTER SYMPTOMS: BACK PAIN: 1

## 2023-11-21 NOTE — ASSESSMENT & PLAN NOTE
It appears that all of her low pressure headache symptoms have resolved I reassured her that this was no longstanding consequence.

## 2023-11-21 NOTE — ASSESSMENT & PLAN NOTE
She has a long history of pseudotumor cerebri. Her symptoms are almost always isolated ocular. Her \"headaches\" are all eye pain and retro-orbital pain. She has visual symptoms with her exacerbations. I think there is a strong likelihood that she may end up with ventricular shunting but before that I would like an opinion for her as a candidate for optic nerve sheath fenestration since all of her symptoms are ocular in nature. I have discovered that there is a Dr. Daniella Maxwell at [address] who has a particular expertise in this and I would love to have his opinion and hopeful intervention prior to committing her to a lifelong shunt. If we are unable to get her into see him or any of the ophthalmologist down to the [address] in a timely urgent fashion then we will look at other options.

## 2023-11-21 NOTE — PROGRESS NOTES
[facility] OFFICE NOTE:         CC: Hospital follow-up with pseudotumor cerebri    History of Present Illness:  Xavi Yo (1995) is a 29 y.o.  female who returns for hospital follow-up after being admitted for back pain and severe headaches after an LP for pseudotumor. She developed a dramatic the CSF leak. She states that it took 2 to 3 days after discharge for her symptoms to subside. She states that her low pressure headaches have resolved but she is now feeling her ocular symptoms that are her baseline pseudotumor symptoms. After her discharge I did some investigating and found out that there is a Dr. Bustamante Section at [address] who is a regional expert in optic nerve sheath fenestration. HPI      Past Medical History:   Diagnosis Date    Pseudotumor       No past surgical history on file. Current Outpatient Medications   Medication Sig Dispense Refill    HYDROmorphone (DILAUDID) 2 MG tablet Take 1 tablet by mouth every 6 hours as needed for Pain for up to 14 days. Max Daily Amount: 8 mg 56 tablet 0    promethazine (PHENERGAN) 25 MG tablet Take 1 tablet by mouth every 6 hours as needed for Nausea 56 tablet 0    metaxalone (SKELAXIN) 800 MG tablet Take 1 tablet by mouth 3 times daily for 14 days 42 tablet 0     No current facility-administered medications for this visit. Allergies   Allergen Reactions    Latex Shortness Of Breath    Latex Hives    Latex Angioedema    Diphenhydramine Shortness Of Breath    Sumatriptan Shortness Of Breath    Adhesive Tape Rash      No family history on file.    Social History     Socioeconomic History    Marital status:      Spouse name: Not on file    Number of children: Not on file    Years of education: Not on file    Highest education level: Not on file   Occupational History    Not on file   Tobacco Use    Smoking status: Never    Smokeless tobacco: Never   Substance and Sexual Activity    Alcohol use: Not Currently
